# Patient Record
Sex: FEMALE | Race: BLACK OR AFRICAN AMERICAN | Employment: PART TIME | URBAN - METROPOLITAN AREA
[De-identification: names, ages, dates, MRNs, and addresses within clinical notes are randomized per-mention and may not be internally consistent; named-entity substitution may affect disease eponyms.]

---

## 2017-02-20 DIAGNOSIS — Z01.419 ENCOUNTER FOR GYNECOLOGICAL EXAMINATION WITHOUT ABNORMAL FINDING: ICD-10-CM

## 2017-02-21 RX ORDER — MEDROXYPROGESTERONE ACETATE 10 MG/1
TABLET ORAL
Qty: 70 TAB | Refills: 0 | Status: SHIPPED | OUTPATIENT
Start: 2017-02-21 | End: 2018-03-15 | Stop reason: SDUPTHER

## 2018-03-15 DIAGNOSIS — Z01.419 ENCOUNTER FOR GYNECOLOGICAL EXAMINATION WITHOUT ABNORMAL FINDING: ICD-10-CM

## 2018-03-15 RX ORDER — MEDROXYPROGESTERONE ACETATE 10 MG/1
TABLET ORAL
Qty: 70 TAB | Refills: 0 | Status: SHIPPED | OUTPATIENT
Start: 2018-03-15 | End: 2019-01-17 | Stop reason: ALTCHOICE

## 2018-12-15 ENCOUNTER — HOSPITAL ENCOUNTER (EMERGENCY)
Age: 23
Discharge: HOME | End: 2018-12-15
Attending: EMERGENCY MEDICINE | Admitting: EMERGENCY MEDICINE
Payer: COMMERCIAL

## 2018-12-15 VITALS
BODY MASS INDEX: 34.36 KG/M2 | HEART RATE: 77 BPM | SYSTOLIC BLOOD PRESSURE: 115 MMHG | DIASTOLIC BLOOD PRESSURE: 54 MMHG | OXYGEN SATURATION: 100 % | WEIGHT: 201.28 LBS | TEMPERATURE: 98 F | RESPIRATION RATE: 15 BRPM | HEIGHT: 64 IN

## 2018-12-15 DIAGNOSIS — N30.01 ACUTE CYSTITIS WITH HEMATURIA: ICD-10-CM

## 2018-12-15 DIAGNOSIS — N92.1 MENOMETRORRHAGIA: Primary | ICD-10-CM

## 2018-12-15 LAB
APPEARANCE UR: CLEAR
BACTERIA URNS QL MICRO: NEGATIVE /HPF
BILIRUB UR QL: NEGATIVE
CLUE CELLS VAG QL WET PREP: NORMAL
COLOR UR: ABNORMAL
EPITH CASTS URNS QL MICRO: ABNORMAL /LPF
GLUCOSE UR STRIP.AUTO-MCNC: NEGATIVE MG/DL
HCG UR QL: NEGATIVE
HGB UR QL STRIP: ABNORMAL
HYALINE CASTS URNS QL MICRO: ABNORMAL /LPF (ref 0–5)
KETONES UR QL STRIP.AUTO: NEGATIVE MG/DL
KOH PREP SPEC: NORMAL
LEUKOCYTE ESTERASE UR QL STRIP.AUTO: ABNORMAL
NITRITE UR QL STRIP.AUTO: NEGATIVE
PH UR STRIP: 7.5 [PH] (ref 5–8)
PROT UR STRIP-MCNC: NEGATIVE MG/DL
RBC #/AREA URNS HPF: ABNORMAL /HPF (ref 0–5)
SERVICE CMNT-IMP: NORMAL
SP GR UR REFRACTOMETRY: 1.02 (ref 1–1.03)
T VAGINALIS VAG QL WET PREP: NORMAL
UA: UC IF INDICATED,UAUC: ABNORMAL
UROBILINOGEN UR QL STRIP.AUTO: 1 EU/DL (ref 0.2–1)
WBC URNS QL MICRO: ABNORMAL /HPF (ref 0–4)

## 2018-12-15 PROCEDURE — 81001 URINALYSIS AUTO W/SCOPE: CPT

## 2018-12-15 PROCEDURE — 81025 URINE PREGNANCY TEST: CPT

## 2018-12-15 PROCEDURE — 87086 URINE CULTURE/COLONY COUNT: CPT

## 2018-12-15 PROCEDURE — 87491 CHLMYD TRACH DNA AMP PROBE: CPT

## 2018-12-15 PROCEDURE — 99284 EMERGENCY DEPT VISIT MOD MDM: CPT

## 2018-12-15 PROCEDURE — 74011250637 HC RX REV CODE- 250/637: Performed by: PHYSICIAN ASSISTANT

## 2018-12-15 PROCEDURE — 87210 SMEAR WET MOUNT SALINE/INK: CPT

## 2018-12-15 RX ORDER — NAPROXEN 250 MG/1
500 TABLET ORAL
Status: COMPLETED | OUTPATIENT
Start: 2018-12-15 | End: 2018-12-15

## 2018-12-15 RX ORDER — KETOROLAC TROMETHAMINE 10 MG/1
10 TABLET, FILM COATED ORAL
Qty: 20 TAB | Refills: 0 | Status: SHIPPED | OUTPATIENT
Start: 2018-12-15 | End: 2018-12-20

## 2018-12-15 RX ORDER — NITROFURANTOIN 25; 75 MG/1; MG/1
100 CAPSULE ORAL 2 TIMES DAILY
Qty: 14 CAP | Refills: 0 | Status: SHIPPED | OUTPATIENT
Start: 2018-12-15 | End: 2018-12-22

## 2018-12-15 RX ORDER — ACETAMINOPHEN 325 MG/1
650 TABLET ORAL
Status: COMPLETED | OUTPATIENT
Start: 2018-12-15 | End: 2018-12-15

## 2018-12-15 RX ADMIN — ACETAMINOPHEN 650 MG: 325 TABLET ORAL at 13:26

## 2018-12-15 RX ADMIN — NAPROXEN 500 MG: 250 TABLET ORAL at 13:26

## 2018-12-15 NOTE — LETTER
[address] 
[facility] EMERGENCY DEPT 
[address] [place] [address] [address] [id number] [phone] Work/School Note Date: 12/15/2018 To Whom It May concern: 
 
Shelby Muse was seen and treated today in the emergency room by the following provider(s): 
Physician Assistant: HAYDEE Iniguez. Shelby Muse may return to work on 12/15/18. Sincerely, HAYDEE Mo

## 2018-12-15 NOTE — ED PROVIDER NOTES
EMERGENCY DEPARTMENT HISTORY AND PHYSICAL EXAM    Date: 12/15/2018  Patient Name: Jasiel Zuleta    History of Presenting Illness     Chief Complaint   Patient presents with    Vaginal Bleeding     Ambulatory into the ED with c/o large amount of vaginal bleeding x 2 days -history of endometriosis. Pt is asymptomatic. History Provided By: Patient and Patient's Mother      HPI: Jasiel Zuleta is a 26 y.o. female with a PMH of asthma and endometriosis who presents with cc of abd cramping due to her menstrual cycle starting 2 days ago. Pt states she has endometriosis and this is similar to her previous cases of endometriosis. Pt denies any concern for pregnancy or stds. pts states she has gone through 2 pads today. Pt has not taken any medication for her symptoms    She specifically denies any fevers, chills, nausea, vomiting, chest pain, shortness of breath, headache, rash, diarrhea, sweating or weight loss. All other ROS negative at this time  Pt is in no acute distress and is speaking in full sentences       PCP: None    Current Outpatient Medications   Medication Sig Dispense Refill    ketorolac (TORADOL) 10 mg tablet Take 1 Tab by mouth every six (6) hours as needed for Pain for up to 5 days. 20 Tab 0    nitrofurantoin, macrocrystal-monohydrate, (MACROBID) 100 mg capsule Take 1 Cap by mouth two (2) times a day for 7 days. 14 Cap 0    medroxyPROGESTERone (PROVERA) 10 mg tablet TAKE 1 TABLET BY MOUTH DAILY FOR THE FIRST 10 DAYS OF EACH MONTH 70 Tab 0       Past History     Past Medical History:  Past Medical History:   Diagnosis Date    Asthma     Other ill-defined conditions(920.50)     ADHD    Seasonal allergies        Past Surgical History:  No past surgical history on file.     Family History:  Family History   Problem Relation Age of Onset    Diabetes Mother     Hypertension Mother    [facility] Diabetes Father     Hypertension Father        Social History:  Social History     Tobacco Use    Smoking status: Never Smoker   Substance Use Topics    Alcohol use: No    Drug use: Not on file       Allergies: Allergies   Allergen Reactions    Pcn [Penicillins] Hives         Review of Systems   Review of Systems   Constitutional: Negative. Negative for chills and fever. HENT: Negative. Eyes: Negative. Respiratory: Negative. Negative for shortness of breath. Cardiovascular: Negative. Negative for chest pain. Gastrointestinal: Positive for abdominal pain (cramping). Negative for diarrhea, nausea and vomiting. Endocrine: Negative. Genitourinary: Positive for menstrual problem and vaginal bleeding. Negative for decreased urine volume, dysuria, flank pain, vaginal discharge and vaginal pain. Musculoskeletal: Negative. Skin: Negative. Allergic/Immunologic: Negative. Neurological: Negative. Negative for headaches. Hematological: Negative. Psychiatric/Behavioral: Negative. All other systems reviewed and are negative. Physical Exam     Vitals:    12/15/18 1205   BP: 115/54   Pulse: 77   Resp: 15   Temp: 98 °F (36.7 °C)   SpO2: 100%   Weight: 91.3 kg (201 lb 4.5 oz)   Height: 5' 4\" (1.626 m)     Physical Exam   Constitutional: She is oriented to person, place, and time. She appears well-developed and well-nourished. No distress. HENT:   Head: Normocephalic and atraumatic. Right Ear: External ear normal.   Left Ear: External ear normal.   Nose: Nose normal.   Mouth/Throat: Oropharynx is clear and moist.   Eyes: Conjunctivae and EOM are normal. Pupils are equal, round, and reactive to light. Neck: Normal range of motion. Neck supple. No tracheal deviation present. Cardiovascular: Normal rate, regular rhythm, normal heart sounds and intact distal pulses. Pulmonary/Chest: Effort normal and breath sounds normal. No respiratory distress. She has no wheezes. Abdominal: Soft. Bowel sounds are normal. She exhibits no distension.  There is no tenderness (non tender throughout abdomen). There is no rebound, no CVA tenderness, no tenderness at McBurney's point and negative Mccoy's sign. Genitourinary: No labial fusion. There is no rash, tenderness, lesion or injury on the right labia. There is no rash, tenderness, lesion or injury on the left labia. Cervix exhibits no motion tenderness, no discharge (mild blood present in the vaginal vault) and no friability. No bleeding in the vagina. Musculoskeletal: Normal range of motion. She exhibits no edema, tenderness or deformity. Lymphadenopathy:     She has no cervical adenopathy. Neurological: She is alert and oriented to person, place, and time. She has normal reflexes. She displays normal reflexes. No cranial nerve deficit. She exhibits normal muscle tone. Coordination normal.   Skin: Skin is warm and dry. She is not diaphoretic. No pallor. Psychiatric: She has a normal mood and affect. Her behavior is normal. Judgment and thought content normal.   Nursing note and vitals reviewed.         Diagnostic Study Results     Labs -     Recent Results (from the past 12 hour(s))   HCG URINE, QL. - POC    Collection Time: 12/15/18  1:04 PM   Result Value Ref Range    Pregnancy test,urine (POC) NEGATIVE  NEG     WET PREP    Collection Time: 12/15/18  1:07 PM   Result Value Ref Range    Clue cells CLUE CELLS PRESENT      Wet prep NO TRICHOMONAS SEEN     KOH, OTHER SOURCES    Collection Time: 12/15/18  1:07 PM   Result Value Ref Range    Special Requests: NO SPECIAL REQUESTS      KOH NO YEAST SEEN     URINALYSIS W/ REFLEX CULTURE    Collection Time: 12/15/18  1:27 PM   Result Value Ref Range    Color YELLOW/STRAW      Appearance CLEAR CLEAR      Specific gravity 1.025 1.003 - 1.030      pH (UA) 7.5 5.0 - 8.0      Protein NEGATIVE  NEG mg/dL    Glucose NEGATIVE  NEG mg/dL    Ketone NEGATIVE  NEG mg/dL    Bilirubin NEGATIVE  NEG      Blood TRACE (A) NEG      Urobilinogen 1.0 0.2 - 1.0 EU/dL    Nitrites NEGATIVE  NEG      Leukocyte Esterase SMALL (A) NEG      WBC 10-20 0 - 4 /hpf    RBC 0-5 0 - 5 /hpf    Epithelial cells FEW FEW /lpf    Bacteria NEGATIVE  NEG /hpf    UA:UC IF INDICATED URINE CULTURE ORDERED (A) CNI      Hyaline cast 2-5 0 - 5 /lpf       Radiologic Studies -   No orders to display     CT Results  (Last 48 hours)    None        CXR Results  (Last 48 hours)    None            Medical Decision Making   I am the first provider for this patient. I reviewed the vital signs, available nursing notes, past medical history, past surgical history, family history and social history. Vital Signs-Reviewed the patient's vital signs. Records Reviewed: Nursing Notes, Old Medical Records, Previous Radiology Studies and Previous Laboratory Studies            Disposition:  Discharge     DISCHARGE NOTE:   Care plan outlined and precautions discussed. Patient has no new complaints, changes, or physical findings. Results of visit were reviewed with the patient. All medications were reviewed with the patient; will d/c home. All of pt's questions and concerns were addressed. Patient was instructed and agrees to follow up with pcp, as well as to return to the ED upon further deterioration. Patient is ready to go home. Follow-up Information     Follow up With Specialties Details Why Contact Info    [facility] Emergency Medicine   [facility]  [address]   [address] [address]  [address]  Schedule an appointment as soon as possible for a visit in 3 days If symptoms worsen [address] [address]          Discharge Medication List as of 12/15/2018  1:53 PM      START taking these medications    Details   ketorolac (TORADOL) 10 mg tablet Take 1 Tab by mouth every six (6) hours as needed for Pain for up to 5 days. , Normal, Disp-20 Tab, R-0         CONTINUE these medications which have NOT CHANGED    Details   medroxyPROGESTERone (PROVERA) 10 mg tablet TAKE 1 TABLET BY MOUTH DAILY FOR THE FIRST 10 DAYS OF EACH MONTH, Normal, Disp-70 Tab, R-0             Provider Notes (Medical Decision Making):   Pt states present episode non different from her previous episodes. Pt is in no distress will do swabs, pelvic and urine preg  Worsening si/sxs discussed extensively   Follow up with PCP or RTC if symptoms/signs worsen  Side effects of medication discussed  Education materials provided at discharge   Pt verbalizes agreement with plan      Procedures:  Procedures  Procedure Note - Pelvic Exam:    1:07 PM  Performed by: HAYDEE Rosado   Chaperoned by: Lili Grande RN  Pelvic exam was performed using bimanual and speculum. No CMT Further findings noted in physical exam.   The procedure took 1-15 minutes, and pt tolerated well. Diagnosis     Clinical Impression:   1. Menometrorrhagia    2.  Acute cystitis with hematuria

## 2018-12-15 NOTE — LETTER
[address] 
[facility] EMERGENCY DEPT 
[address] [address] 
[phone] Work/School Note Date: 12/15/2018 To Whom It May concern: 
 
Rafi Martinez was seen and treated today in the emergency room by the following provider(s): 
Physician Assistant: Clancy Nyhan, PA. Rafi Martinez may return to work on 12/17/18. Sincerely, HAYDEE Sousa

## 2018-12-15 NOTE — DISCHARGE INSTRUCTIONS
Abnormal Uterine Bleeding: Care Instructions  Your Care Instructions    Abnormal uterine bleeding (AUB) is irregular bleeding from the uterus that is longer or heavier than usual or does not occur at your regular time. Sometimes it is caused by changes in hormone levels. It can also be caused by growths in the uterus, such as fibroids or polyps. Sometimes a cause cannot be found. You may have heavy bleeding when you are not expecting your period. Your doctor may suggest a pregnancy test, if you think you are pregnant. Follow-up care is a key part of your treatment and safety. Be sure to make and go to all appointments, and call your doctor if you are having problems. It's also a good idea to know your test results and keep a list of the medicines you take. How can you care for yourself at home? · Be safe with medicines. Take pain medicines exactly as directed. ? If the doctor gave you a prescription medicine for pain, take it as prescribed. ? If you are not taking a prescription pain medicine, ask your doctor if you can take an over-the-counter medicine. · You may be low in iron because of blood loss. Eat a balanced diet that is high in iron and vitamin C. Foods rich in iron include red meat, shellfish, eggs, beans, and leafy green vegetables. Talk to your doctor about whether you need to take iron pills or a multivitamin. When should you call for help? Call 911 anytime you think you may need emergency care. For example, call if:    · You passed out (lost consciousness).    Call your doctor now or seek immediate medical care if:    · You have new or worse belly or pelvic pain.     · You have severe vaginal bleeding.     · You feel dizzy or lightheaded, or you feel like you may faint.    Watch closely for changes in your health, and be sure to contact your doctor if:    · You think you may be pregnant.     · Your bleeding gets worse.     · You do not get better as expected.    Where can you learn more?  Go to http://racquel-rodrigo.info/. Enter S447 in the search box to learn more about \"Abnormal Uterine Bleeding: Care Instructions. \"  Current as of: May 15, 2018  Content Version: 11.8  © 9011-5597 Healthwise, [employer]. Care instructions adapted under license by [employer] (which disclaims liability or warranty for this information). If you have questions about a medical condition or this instruction, always ask your healthcare professional. [address] any warranty or liability for your use of this information.

## 2018-12-17 LAB
BACTERIA SPEC CULT: NORMAL
CC UR VC: NORMAL
SERVICE CMNT-IMP: NORMAL

## 2018-12-18 LAB
C TRACH DNA SPEC QL NAA+PROBE: POSITIVE
N GONORRHOEA DNA SPEC QL NAA+PROBE: NEGATIVE
SAMPLE TYPE: ABNORMAL
SERVICE CMNT-IMP: ABNORMAL
SPECIMEN SOURCE: ABNORMAL

## 2018-12-18 RX ORDER — AZITHROMYCIN 250 MG/1
1000 TABLET, FILM COATED ORAL DAILY
Qty: 4 TAB | Refills: 0 | Status: SHIPPED | OUTPATIENT
Start: 2018-12-18 | End: 2018-12-19

## 2018-12-18 NOTE — PROGRESS NOTES
Pt returned call. Reviewed lab results. 1gm Zithromax efiled to her pharmacy of choice. Encouraged her to contact her partner(s) so they may be tested/treated. Good understanding noted.

## 2018-12-18 NOTE — PROGRESS NOTES
Chart reviewed. Pt self swabbed. No pelvic exam performed. Diagnosed with menorrhagia. No empiric tx for STDs. Left message requesting return call on patient' cell phone.

## 2019-01-17 ENCOUNTER — OFFICE VISIT (OUTPATIENT)
Dept: FAMILY MEDICINE CLINIC | Age: 24
End: 2019-01-17

## 2019-01-17 ENCOUNTER — HOSPITAL ENCOUNTER (OUTPATIENT)
Dept: LAB | Age: 24
Discharge: HOME | End: 2019-01-17
Payer: COMMERCIAL

## 2019-01-17 VITALS
HEART RATE: 84 BPM | SYSTOLIC BLOOD PRESSURE: 116 MMHG | TEMPERATURE: 99 F | WEIGHT: 214 LBS | HEIGHT: 64 IN | DIASTOLIC BLOOD PRESSURE: 66 MMHG | RESPIRATION RATE: 20 BRPM | BODY MASS INDEX: 36.54 KG/M2 | OXYGEN SATURATION: 99 %

## 2019-01-17 DIAGNOSIS — N30.01 ACUTE CYSTITIS WITH HEMATURIA: ICD-10-CM

## 2019-01-17 DIAGNOSIS — Z01.419 ENCOUNTER FOR GYNECOLOGICAL EXAMINATION: ICD-10-CM

## 2019-01-17 DIAGNOSIS — Z32.00 POSSIBLE PREGNANCY: ICD-10-CM

## 2019-01-17 DIAGNOSIS — Z13.29 SCREENING FOR THYROID DISORDER: ICD-10-CM

## 2019-01-17 DIAGNOSIS — Z00.00 ENCOUNTER FOR ANNUAL PHYSICAL EXAM: Primary | ICD-10-CM

## 2019-01-17 DIAGNOSIS — Z30.011 OCP (ORAL CONTRACEPTIVE PILLS) INITIATION: ICD-10-CM

## 2019-01-17 DIAGNOSIS — E55.9 VITAMIN D DEFICIENCY: ICD-10-CM

## 2019-01-17 DIAGNOSIS — Z13.220 SCREENING CHOLESTEROL LEVEL: ICD-10-CM

## 2019-01-17 DIAGNOSIS — R73.02 IGT (IMPAIRED GLUCOSE TOLERANCE): ICD-10-CM

## 2019-01-17 DIAGNOSIS — R35.0 FREQUENCY OF URINATION: ICD-10-CM

## 2019-01-17 LAB
BILIRUB UR QL STRIP: NEGATIVE
GLUCOSE UR-MCNC: NEGATIVE MG/DL
HCG URINE, QL. (POC): NEGATIVE
KETONES P FAST UR STRIP-MCNC: NEGATIVE MG/DL
NEGATIVE CONTROL: NORMAL
PH UR STRIP: 7 [PH] (ref 4.6–8)
PROT UR QL STRIP: NORMAL
SP GR UR STRIP: 1.02 (ref 1–1.03)
UA UROBILINOGEN AMB POC: NORMAL (ref 0.2–1)
URINALYSIS CLARITY POC: CLEAR
URINALYSIS COLOR POC: YELLOW
URINE BLOOD POC: NEGATIVE
URINE LEUKOCYTES POC: NORMAL
URINE NITRITES POC: NEGATIVE
VALID INTERNAL CONTROL?: YES

## 2019-01-17 PROCEDURE — 88175 CYTOPATH C/V AUTO FLUID REDO: CPT

## 2019-01-17 RX ORDER — NORGESTIMATE AND ETHINYL ESTRADIOL 0.25-0.035
1 KIT ORAL DAILY
Qty: 1 DOSE PACK | Refills: 6 | Status: SHIPPED | OUTPATIENT
Start: 2019-01-17 | End: 2019-12-09

## 2019-01-17 RX ORDER — CIPROFLOXACIN 500 MG/1
500 TABLET ORAL 2 TIMES DAILY
Qty: 14 TAB | Refills: 0 | Status: SHIPPED | OUTPATIENT
Start: 2019-01-17 | End: 2019-01-24

## 2019-01-17 NOTE — PROGRESS NOTES
Chief Complaint   Patient presents with    New Patient     discuss birth control     HPI:  Glenna Layton is a 26 y.o. AA female with no significant pmhx presents to establish care for a complete physical.  Also, she wants to discuss options for birth control. Patient has no complaints. Review of Systems  Constitutional: negative for fevers/chills  Eyes:   negative for visual disturbance  Respiratory:  negative for cough, dyspnea, wheezing  CV:   negative for chest pain, palpitations, lower extremity edema  GI:   negative for nausea, vomiting, diarrhea, abdominal pain  Endo:               negative for polyuria,polydipsia,polyphagia,heat intolerance  Genitourinary: negative for frequency, dysuria  Integument:  negative for rash and pruritus  Musculoskel: negative for myalgias, arthralgias, back pain, joint pain  Neurological:  negative for headaches, dizziness  Behavl/Psych: negative for feelings of anxiety, depression, mood changes    Past Medical History:   Diagnosis Date    Asthma     Other ill-defined conditions(277.87)     ADHD    Seasonal allergies      History reviewed. No pertinent surgical history.   Social History     Socioeconomic History    Marital status: SINGLE     Spouse name: Not on file    Number of children: Not on file    Years of education: Not on file    Highest education level: Not on file   Tobacco Use    Smoking status: Never Smoker    Smokeless tobacco: Never Used   Substance and Sexual Activity    Alcohol use: No    Drug use: Yes     Types: Marijuana    Sexual activity: Yes     Partners: Male     Birth control/protection: Condom     Family History   Problem Relation Age of Onset    Diabetes Mother     Hypertension Mother     Diabetes Father     Hypertension Father      Current Outpatient Medications   Medication Sig Dispense Refill    medroxyPROGESTERone (PROVERA) 10 mg tablet TAKE 1 TABLET BY MOUTH DAILY FOR THE FIRST 10 DAYS OF EACH MONTH 70 Tab 0     Allergies Allergen Reactions    Pcn [Penicillins] Hives       Objective:  Visit Vitals  /66   Pulse 84   Temp 99 °F (37.2 °C) (Oral)   Resp 20   Ht 5' 4\" (1.626 m)   Wt 214 lb (97.1 kg)   LMP 12/19/2018   SpO2 99%   BMI 36.73 kg/m²     Physical Exam:   General appearance - obese, alert, well appearing in no distress  Mental status - alert, oriented to person, place, and time  EYE-PERRL, EOMI  ENT-ENT exam normal, no neck nodes or sinus tenderness  Mouth - mucous membranes moist, pharynx normal without lesions  Neck - supple, no significant adenopathy   Chest - clear to auscultation, no wheezes, rales or rhonchi    Heart - normal rate, regular rhythm, normal blood pressure  Abdomen - soft, nontender, nondistended, no organomegaly  Ext-peripheral pulses normal, no pedal edema  Neuro -alert, oriented, normal speech, no focal findings  Back-full range of motion, no tenderness, palpable spasm or pain on motion     Pregnancy test and U/A are negative    Assessment/Plan:  Diagnoses and all orders for this visit:    Encounter for annual physical exam  -     METABOLIC PANEL, COMPREHENSIVE  -     CBC WITH AUTOMATED DIFF    Screening for thyroid disorder  -     TSH 3RD GENERATION    Vitamin D deficiency  -     VITAMIN D, 25 HYDROXY    Screening cholesterol level  -     LIPID PANEL    Frequency of urination  -     URINALYSIS W/ RFLX MICROSCOPIC  -     AMB POC URINALYSIS DIP STICK AUTO W/O MICRO    IGT (impaired glucose tolerance)  -     HEMOGLOBIN A1C WITH EAG    Possible pregnancy  -     AMB POC URINE PREGNANCY TEST, VISUAL COLOR COMPARISON    OCP (oral contraceptive pills) initiation  -     norgestimate-ethinyl estradiol (ORTHO-CYCLEN, SPRINTEC) 0.25-35 mg-mcg tab; Take 1 Tab by mouth daily. , Normal, Disp-1 Dose Pack, R-6    Acute cystitis with hematuria  -     ciprofloxacin HCl (CIPRO) 500 mg tablet; Take 1 Tab by mouth two (2) times a day for 7 days. , Normal, Disp-14 Tab, R-0    Encounter for gynecological examination  - PAP (IMAGE GUIDED) W/REFL HPV ALL PATHOLOGY ([id number]); Future  -     PAP (IMAGE GUIDED) W/REFL HPV ALL PATHOLOGY ([id number])    Other orders  -     MICROSCOPIC EXAMINATION      Patient Instructions      Ethinyl Estradiol/Norgestimate (Estarylla, Mono-Linyah, MonoNessa, Ortho Tri-Cyclen) - (By mouth)   Why this medicine is used:   Prevents pregnancy and treats acne. Contact a nurse or doctor right away if you have:  · Blistering, peeling, red skin rash  · Chest pain, trouble breathing, or coughing up blood  · Breast lumps, tenderness, pain, swelling, or discharge  · Sudden or severe headache, problems with vision, speech, or walking  · Nausea, unusual sweating, fainting  · Numbness or weakness on one side of your body, lower leg pain  · Unusual or unexpected vaginal bleeding or heavy bleeding  · Yellow skin or eyes   © 2017 [address] is for End User's use only and may not be sold, redistributed or otherwise used for commercial purposes. Follow-up Disposition:  Return 2-3 weeks, for f/u results.

## 2019-01-17 NOTE — PATIENT INSTRUCTIONS
Ethinyl Estradiol/Norgestimate (Estarylla, Mono-Linyah, MonoNessa, Ortho Tri-Cyclen) - (By mouth)   Why this medicine is used:   Prevents pregnancy and treats acne. Contact a nurse or doctor right away if you have:  · Blistering, peeling, red skin rash  · Chest pain, trouble breathing, or coughing up blood  · Breast lumps, tenderness, pain, swelling, or discharge  · Sudden or severe headache, problems with vision, speech, or walking  · Nausea, unusual sweating, fainting  · Numbness or weakness on one side of your body, lower leg pain  · Unusual or unexpected vaginal bleeding or heavy bleeding  · Yellow skin or eyes   © 2017 [address] is for End User's use only and may not be sold, redistributed or otherwise used for commercial purposes.

## 2019-01-18 LAB
25(OH)D3+25(OH)D2 SERPL-MCNC: 8 NG/ML (ref 30–100)
ALBUMIN SERPL-MCNC: 4.7 G/DL (ref 3.5–5.5)
ALBUMIN/GLOB SERPL: 2.5 {RATIO} (ref 1.2–2.2)
ALP SERPL-CCNC: 68 IU/L (ref 39–117)
ALT SERPL-CCNC: 15 IU/L (ref 0–32)
APPEARANCE UR: CLEAR
AST SERPL-CCNC: 17 IU/L (ref 0–40)
BACTERIA #/AREA URNS HPF: ABNORMAL /[HPF]
BASOPHILS # BLD AUTO: 0 X10E3/UL (ref 0–0.2)
BASOPHILS NFR BLD AUTO: 0 %
BILIRUB SERPL-MCNC: 1.1 MG/DL (ref 0–1.2)
BILIRUB UR QL STRIP: NEGATIVE
BUN SERPL-MCNC: 9 MG/DL (ref 6–20)
BUN/CREAT SERPL: 15 (ref 9–23)
CALCIUM SERPL-MCNC: 9.1 MG/DL (ref 8.7–10.2)
CASTS URNS QL MICRO: ABNORMAL /LPF
CHLORIDE SERPL-SCNC: 102 MMOL/L (ref 96–106)
CHOLEST SERPL-MCNC: 126 MG/DL (ref 100–199)
CO2 SERPL-SCNC: 23 MMOL/L (ref 20–29)
COLOR UR: YELLOW
CREAT SERPL-MCNC: 0.62 MG/DL (ref 0.57–1)
CRYSTALS URNS MICRO: ABNORMAL
EOSINOPHIL # BLD AUTO: 0 X10E3/UL (ref 0–0.4)
EOSINOPHIL NFR BLD AUTO: 1 %
EPI CELLS #/AREA URNS HPF: ABNORMAL /HPF
ERYTHROCYTE [DISTWIDTH] IN BLOOD BY AUTOMATED COUNT: 13.2 % (ref 12.3–15.4)
EST. AVERAGE GLUCOSE BLD GHB EST-MCNC: 94 MG/DL
GLOBULIN SER CALC-MCNC: 1.9 G/DL (ref 1.5–4.5)
GLUCOSE SERPL-MCNC: 94 MG/DL (ref 65–99)
GLUCOSE UR QL: NEGATIVE
HBA1C MFR BLD: 4.9 % (ref 4.8–5.6)
HCT VFR BLD AUTO: 38.3 % (ref 34–46.6)
HDLC SERPL-MCNC: 49 MG/DL
HGB BLD-MCNC: 12.9 G/DL (ref 11.1–15.9)
HGB UR QL STRIP: NEGATIVE
IMM GRANULOCYTES # BLD AUTO: 0 X10E3/UL (ref 0–0.1)
IMM GRANULOCYTES NFR BLD AUTO: 0 %
KETONES UR QL STRIP: NEGATIVE
LDLC SERPL CALC-MCNC: 50 MG/DL (ref 0–99)
LEUKOCYTE ESTERASE UR QL STRIP: ABNORMAL
LYMPHOCYTES # BLD AUTO: 2.3 X10E3/UL (ref 0.7–3.1)
LYMPHOCYTES NFR BLD AUTO: 39 %
MCH RBC QN AUTO: 30.9 PG (ref 26.6–33)
MCHC RBC AUTO-ENTMCNC: 33.7 G/DL (ref 31.5–35.7)
MCV RBC AUTO: 92 FL (ref 79–97)
MICRO URNS: ABNORMAL
MONOCYTES # BLD AUTO: 0.3 X10E3/UL (ref 0.1–0.9)
MONOCYTES NFR BLD AUTO: 5 %
MUCOUS THREADS URNS QL MICRO: PRESENT
NEUTROPHILS # BLD AUTO: 3.2 X10E3/UL (ref 1.4–7)
NEUTROPHILS NFR BLD AUTO: 55 %
NITRITE UR QL STRIP: NEGATIVE
PH UR STRIP: 7.5 [PH] (ref 5–7.5)
PLATELET # BLD AUTO: 256 X10E3/UL (ref 150–379)
POTASSIUM SERPL-SCNC: 4.3 MMOL/L (ref 3.5–5.2)
PROT SERPL-MCNC: 6.6 G/DL (ref 6–8.5)
PROT UR QL STRIP: NEGATIVE
RBC # BLD AUTO: 4.17 X10E6/UL (ref 3.77–5.28)
RBC #/AREA URNS HPF: ABNORMAL /HPF
SODIUM SERPL-SCNC: 142 MMOL/L (ref 134–144)
SP GR UR: 1.03 (ref 1–1.03)
TRIGL SERPL-MCNC: 135 MG/DL (ref 0–149)
TSH SERPL DL<=0.005 MIU/L-ACNC: 1.46 UIU/ML (ref 0.45–4.5)
UNIDENT CRYS URNS QL MICRO: PRESENT
UROBILINOGEN UR STRIP-MCNC: 1 MG/DL (ref 0.2–1)
VLDLC SERPL CALC-MCNC: 27 MG/DL (ref 5–40)
WBC # BLD AUTO: 5.8 X10E3/UL (ref 3.4–10.8)
WBC #/AREA URNS HPF: ABNORMAL /HPF

## 2019-02-27 DIAGNOSIS — E55.9 VITAMIN D DEFICIENCY: ICD-10-CM

## 2019-02-27 DIAGNOSIS — N30.00 ACUTE CYSTITIS WITHOUT HEMATURIA: Primary | ICD-10-CM

## 2019-02-27 RX ORDER — CHOLECALCIFEROL TAB 125 MCG (5000 UNIT) 125 MCG
5000 TAB ORAL DAILY
Qty: 90 TAB | Refills: 1 | Status: SHIPPED | OUTPATIENT
Start: 2019-02-27 | End: 2019-12-09

## 2019-02-27 RX ORDER — CIPROFLOXACIN 500 MG/1
500 TABLET ORAL 2 TIMES DAILY
Qty: 10 TAB | Refills: 0 | Status: SHIPPED | OUTPATIENT
Start: 2019-02-27 | End: 2019-03-04

## 2019-02-27 NOTE — PROGRESS NOTES
Please call the office for appointment within 1-2 weeks  to discuss results and management. Vit D is very low, results shows uti.   Vit d supplement and antibiotics have sent to your pharmacy

## 2019-03-08 ENCOUNTER — TELEPHONE (OUTPATIENT)
Dept: FAMILY MEDICINE CLINIC | Age: 24
End: 2019-03-08

## 2019-03-08 NOTE — TELEPHONE ENCOUNTER
----- Message from Josephine Rollins MD sent at 2/27/2019  2:24 PM EST -----      Please call the office for appointment within 1-2 weeks  to discuss results and management. Vit D is very low, results shows uti.   Vit d supplement and antibiotics have sent to your pharmacy

## 2019-03-29 ENCOUNTER — HOSPITAL ENCOUNTER (EMERGENCY)
Age: 24
Discharge: LEFT BEFORE DISCHARGE | End: 2019-03-29
Attending: EMERGENCY MEDICINE
Payer: COMMERCIAL

## 2019-03-29 VITALS
HEART RATE: 78 BPM | HEIGHT: 64 IN | OXYGEN SATURATION: 98 % | TEMPERATURE: 98.6 F | BODY MASS INDEX: 35.17 KG/M2 | RESPIRATION RATE: 20 BRPM | DIASTOLIC BLOOD PRESSURE: 90 MMHG | SYSTOLIC BLOOD PRESSURE: 138 MMHG | WEIGHT: 206 LBS

## 2019-03-29 PROCEDURE — 75810000275 HC EMERGENCY DEPT VISIT NO LEVEL OF CARE

## 2019-03-29 PROCEDURE — 93005 ELECTROCARDIOGRAM TRACING: CPT
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
## 2019-03-30 ENCOUNTER — HOSPITAL ENCOUNTER (EMERGENCY)
Age: 24
Discharge: HOME | End: 2019-03-30
Attending: EMERGENCY MEDICINE | Admitting: EMERGENCY MEDICINE
Payer: COMMERCIAL

## 2019-03-30 ENCOUNTER — APPOINTMENT (OUTPATIENT)
Dept: CT IMAGING | Age: 24
End: 2019-03-30
Attending: PHYSICIAN ASSISTANT
Payer: COMMERCIAL

## 2019-03-30 VITALS
WEIGHT: 207.45 LBS | SYSTOLIC BLOOD PRESSURE: 116 MMHG | HEIGHT: 64 IN | RESPIRATION RATE: 16 BRPM | BODY MASS INDEX: 35.42 KG/M2 | DIASTOLIC BLOOD PRESSURE: 97 MMHG | OXYGEN SATURATION: 100 % | TEMPERATURE: 98.4 F | HEART RATE: 80 BPM

## 2019-03-30 DIAGNOSIS — F43.0 ACUTE STRESS REACTION: Primary | ICD-10-CM

## 2019-03-30 DIAGNOSIS — S16.1XXA STRAIN OF NECK MUSCLE, INITIAL ENCOUNTER: ICD-10-CM

## 2019-03-30 DIAGNOSIS — S09.90XA INJURY OF HEAD, INITIAL ENCOUNTER: ICD-10-CM

## 2019-03-30 LAB
ATRIAL RATE: 73 BPM
CALCULATED P AXIS, ECG09: 50 DEGREES
CALCULATED R AXIS, ECG10: 36 DEGREES
CALCULATED T AXIS, ECG11: 36 DEGREES
DIAGNOSIS, 93000: NORMAL
HCG UR QL: NEGATIVE
P-R INTERVAL, ECG05: 150 MS
Q-T INTERVAL, ECG07: 352 MS
QRS DURATION, ECG06: 96 MS
QTC CALCULATION (BEZET), ECG08: 387 MS
VENTRICULAR RATE, ECG03: 73 BPM

## 2019-03-30 PROCEDURE — 70450 CT HEAD/BRAIN W/O DYE: CPT

## 2019-03-30 PROCEDURE — 72125 CT NECK SPINE W/O DYE: CPT

## 2019-03-30 PROCEDURE — 74011250637 HC RX REV CODE- 250/637: Performed by: PHYSICIAN ASSISTANT

## 2019-03-30 PROCEDURE — 99284 EMERGENCY DEPT VISIT MOD MDM: CPT

## 2019-03-30 PROCEDURE — 81025 URINE PREGNANCY TEST: CPT

## 2019-03-30 RX ORDER — CYCLOBENZAPRINE HCL 10 MG
10 TABLET ORAL
Status: COMPLETED | OUTPATIENT
Start: 2019-03-30 | End: 2019-03-30

## 2019-03-30 RX ORDER — IBUPROFEN 600 MG/1
600 TABLET ORAL
Status: COMPLETED | OUTPATIENT
Start: 2019-03-30 | End: 2019-03-30

## 2019-03-30 RX ORDER — IBUPROFEN 600 MG/1
600 TABLET ORAL
Qty: 20 TAB | Refills: 0 | Status: SHIPPED | OUTPATIENT
Start: 2019-03-30

## 2019-03-30 RX ORDER — CYCLOBENZAPRINE HCL 10 MG
10 TABLET ORAL
Qty: 15 TAB | Refills: 0 | Status: SHIPPED | OUTPATIENT
Start: 2019-03-30 | End: 2019-12-09

## 2019-03-30 RX ADMIN — IBUPROFEN 600 MG: 600 TABLET, FILM COATED ORAL at 18:07

## 2019-03-30 RX ADMIN — CYCLOBENZAPRINE HYDROCHLORIDE 10 MG: 10 TABLET, FILM COATED ORAL at 18:07

## 2019-03-30 NOTE — DISCHARGE INSTRUCTIONS
Patient Education        Anxiety Disorder: Care Instructions  Your Care Instructions    Anxiety is a normal reaction to stress. Difficult situations can cause you to have symptoms such as sweaty palms and a nervous feeling. In an anxiety disorder, the symptoms are far more severe. Constant worry, muscle tension, trouble sleeping, nausea and diarrhea, and other symptoms can make normal daily activities difficult or impossible. These symptoms may occur for no reason, and they can affect your work, school, or social life. Medicines, counseling, and self-care can all help. Follow-up care is a key part of your treatment and safety. Be sure to make and go to all appointments, and call your doctor if you are having problems. It's also a good idea to know your test results and keep a list of the medicines you take. How can you care for yourself at home? · Take medicines exactly as directed. Call your doctor if you think you are having a problem with your medicine. · Go to your counseling sessions and follow-up appointments. · Recognize and accept your anxiety. Then, when you are in a situation that makes you anxious, say to yourself, \"This is not an emergency. I feel uncomfortable, but I am not in danger. I can keep going even if I feel anxious. \"  · Be kind to your body:  ? Relieve tension with exercise or a massage. ? Get enough rest.  ? Avoid alcohol, caffeine, nicotine, and illegal drugs. They can increase your anxiety level and cause sleep problems. ? Learn and do relaxation techniques. See below for more about these techniques. · Engage your mind. Get out and do something you enjoy. Go to a funny movie, or take a walk or hike. Plan your day. Having too much or too little to do can make you anxious. · Keep a record of your symptoms. Discuss your fears with a good friend or family member, or join a support group for people with similar problems. Talking to others sometimes relieves stress.   · Get involved in social groups, or volunteer to help others. Being alone sometimes makes things seem worse than they are. · Get at least 30 minutes of exercise on most days of the week to relieve stress. Walking is a good choice. You also may want to do other activities, such as running, swimming, cycling, or playing tennis or team sports. Relaxation techniques  Do relaxation exercises 10 to 20 minutes a day. You can play soothing, relaxing music while you do them, if you wish. · Tell others in your house that you are going to do your relaxation exercises. Ask them not to disturb you. · Find a comfortable place, away from all distractions and noise. · Lie down on your back, or sit with your back straight. · Focus on your breathing. Make it slow and steady. · Breathe in through your nose. Breathe out through either your nose or mouth. · Breathe deeply, filling up the area between your navel and your rib cage. Breathe so that your belly goes up and down. · Do not hold your breath. · Breathe like this for 5 to 10 minutes. Notice the feeling of calmness throughout your whole body. As you continue to breathe slowly and deeply, relax by doing the following for another 5 to 10 minutes:  · Tighten and relax each muscle group in your body. You can begin at your toes and work your way up to your head. · Imagine your muscle groups relaxing and becoming heavy. · Empty your mind of all thoughts. · Let yourself relax more and more deeply. · Become aware of the state of calmness that surrounds you. · When your relaxation time is over, you can bring yourself back to alertness by moving your fingers and toes and then your hands and feet and then stretching and moving your entire body. Sometimes people fall asleep during relaxation, but they usually wake up shortly afterward. · Always give yourself time to return to full alertness before you drive a car or do anything that might cause an accident if you are not fully alert.  Never play a relaxation tape while you drive a car. When should you call for help? Call 911 anytime you think you may need emergency care. For example, call if:    · You feel you cannot stop from hurting yourself or someone else.   Hebert Dobbs the numbers for these national suicide hotlines: 0-772-740-TALK (1-242.330.3219) and 0-575-MQQXMSZ (7-828.771.8567). If you or someone you know talks about suicide or feeling hopeless, get help right away.   Watch closely for changes in your health, and be sure to contact your doctor if:    · You have anxiety or fear that affects your life.     · You have symptoms of anxiety that are new or different from those you had before. Where can you learn more? Go to http://racquelAito BVrodrigo.info/. Enter P754 in the search box to learn more about \"Anxiety Disorder: Care Instructions. \"  Current as of: September 11, 2018  Content Version: 11.9  © 4826-4839 [employer]. Care instructions adapted under license by [employer] (which disclaims liability or warranty for this information). If you have questions about a medical condition or this instruction, always ask your healthcare professional. [place] [address] any warranty or liability for your use of this information. Patient Education        Neck Strain: Care Instructions  Your Care Instructions    You have strained the muscles and ligaments in your neck. A sudden, awkward movement can strain the neck. This often occurs with falls or car accidents or during certain sports. Everyday activities like working on a computer or sleeping can also cause neck strain if they force you to hold your neck in an awkward position for a long time. It is common for neck pain to get worse for a day or two after an injury, but it should start to feel better after that. You may have more pain and stiffness for several days before it gets better. This is expected.  It may take a few weeks or longer for it to heal completely. Good home treatment can help you get better faster and avoid future neck problems. Follow-up care is a key part of your treatment and safety. Be sure to make and go to all appointments, and call your doctor if you are having problems. It's also a good idea to know your test results and keep a list of the medicines you take. How can you care for yourself at home? · If you were given a neck brace (cervical collar) to limit neck motion, wear it as instructed for as many days as your doctor tells you to. Do not wear it longer than you were told to. Wearing a brace for too long can make neck stiffness worse and weaken the neck muscles. · You can try using heat or ice to see if it helps. ? Try using a heating pad on a low or medium setting for 15 to 20 minutes every 2 to 3 hours. Try a warm shower in place of one session with the heating pad. You can also buy single-use heat wraps that last up to 8 hours. ? You can also try an ice pack for 10 to 15 minutes every 2 to 3 hours. · Take pain medicines exactly as directed. ? If the doctor gave you a prescription medicine for pain, take it as prescribed. ? If you are not taking a prescription pain medicine, ask your doctor if you can take an over-the-counter medicine. · Gently rub the area to relieve pain and help with blood flow. Do not massage the area if it hurts to do so. · Do not do anything that makes the pain worse. Take it easy for a couple of days. You can do your usual activities if they do not hurt your neck or put it at risk for more stress or injury. · Try sleeping on a special neck pillow. Place it under your neck, not under your head. Placing a tightly rolled-up towel under your neck while you sleep will also work. If you use a neck pillow or rolled towel, do not use your regular pillow at the same time. · To prevent future neck pain, do exercises to stretch and strengthen your neck and back.  Learn how to use good posture, safe lifting techniques, and proper body mechanics. When should you call for help? Call 911 anytime you think you may need emergency care. For example, call if:    · You are unable to move an arm or a leg at all.   [facility] your doctor now or seek immediate medical care if:    · You have new or worse symptoms in your arms, legs, chest, belly, or buttocks. Symptoms may include:  ? Numbness or tingling. ? Weakness. ? Pain.     · You lose bladder or bowel control.    Watch closely for changes in your health, and be sure to contact your doctor if:    · You are not getting better as expected. Where can you learn more? Go to http://racquel-rodrigo.info/. Enter M253 in the search box to learn more about \"Neck Strain: Care Instructions. \"  Current as of: September 20, 2018  Content Version: 11.9  © 2671-1637 [employer]. Care instructions adapted under license by Sara Campbell (which disclaims liability or warranty for this information). If you have questions about a medical condition or this instruction, always ask your healthcare professional. [address] any warranty or liability for your use of this information.
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
## 2019-07-03 ENCOUNTER — OFFICE VISIT (OUTPATIENT)
Dept: SURGERY | Age: 24
End: 2019-07-03

## 2019-07-03 VITALS
BODY MASS INDEX: 35 KG/M2 | HEIGHT: 64 IN | HEART RATE: 65 BPM | RESPIRATION RATE: 18 BRPM | WEIGHT: 205 LBS | TEMPERATURE: 98 F | SYSTOLIC BLOOD PRESSURE: 103 MMHG | DIASTOLIC BLOOD PRESSURE: 58 MMHG | OXYGEN SATURATION: 98 %

## 2019-07-03 DIAGNOSIS — N92.6 IRREGULAR MENSES: Primary | ICD-10-CM

## 2019-07-03 DIAGNOSIS — N93.9 ABNORMAL UTERINE BLEEDING (AUB): ICD-10-CM

## 2019-07-03 RX ORDER — ETONOGESTREL AND ETHINYL ESTRADIOL 11.7; 2.7 MG/1; MG/1
INSERT, EXTENDED RELEASE VAGINAL
Qty: 1 DEVICE | Refills: 12 | Status: SHIPPED | OUTPATIENT
Start: 2019-07-03

## 2019-07-03 NOTE — PROGRESS NOTES
Chief Complaint   Patient presents with    Irregular Menses     Pt reports her periods have periods always been irregular and she has been on OCP x 1 year. Pt reports she has been bleeding every two weeks for the last few months. 3 most recent PHQ Screens 7/3/2019   Little interest or pleasure in doing things Not at all   Feeling down, depressed, irritable, or hopeless Not at all   Total Score PHQ 2 0     1. Have you been to the ER, urgent care clinic since your last visit? Hospitalized since your last visit? No    2. Have you seen or consulted any other health care providers outside of the [address] [place] [address] since your last visit? Include any pap smears or colon screening.  No

## 2019-07-03 NOTE — PROGRESS NOTES
Abnormal bleeding note      Christina Alva is a 27 y.o. female who complains of vaginal bleeding problems. Her current method of family planning is OCP (estrogen/progesterone). Started OCPs a couple of months ago (more than 3 months). Has been having some irregular bleeding for a couple of months. Rarely misses a dose (1-2 out of a pack). Initially patient was regulated, but has not worked as well recently.    +cramping. Has improved with OCPs. Also occasional nausea. Periods were irregular prior to the birth control pills. Periods are not as heavy with the OCPs, but still somewhat heavy. Varied length with OCPs. Last about 15 days at longest.    Associated symptoms include nausea and cramping. Alleviating factors: none    Aggravating factors: none      The patient is sexually active. Last Pap smear:earlier this year, normal.    No recent stresses    Patient concerned as she has a strong family hx of cervical cancer. Patient has gotten the Gardasil vaccine     Her relevant past medical history:   Past Medical History:   Diagnosis Date    Asthma     Other ill-defined conditions(016.89)     ADHD    Seasonal allergies         History reviewed. No pertinent surgical history. Social History     Occupational History    Not on file   Tobacco Use    Smoking status: Never Smoker    Smokeless tobacco: Never Used   Substance and Sexual Activity    Alcohol use: No    Drug use: Yes     Types: Marijuana    Sexual activity: Yes     Partners: Male     Birth control/protection: Condom     Family History   Problem Relation Age of Onset    Diabetes Mother     Hypertension Mother     Diabetes Father     Hypertension Father        Allergies   Allergen Reactions    Pcn [Penicillins] Hives     Prior to Admission medications    Medication Sig Start Date End Date Taking? Authorizing Provider   ibuprofen (MOTRIN) 600 mg tablet Take 1 Tab by mouth every six (6) hours as needed for Pain.  3/30/19  Yes Noé Ortiz [place]   norgestimate-ethinyl estradiol (ORTHO-CYCLEN, SPRINTEC) 0.25-35 mg-mcg tab Take 1 Tab by mouth daily. 1/17/19  Yes Jemma Purcell MD   cyclobenzaprine (FLEXERIL) 10 mg tablet Take 1 Tab by mouth three (3) times daily as needed for Muscle Spasm(s). 3/30/19   Noé Borges PA   cholecalciferol, VITAMIN D3, (VITAMIN D3) 5,000 unit tab tablet Take 1 Tab by mouth daily.  2/27/19   Ian Krishna MD        Review of Systems - History obtained from the patient  Constitutional: negative for weight loss, fever, night sweats  HEENT: negative for hearing loss, earache, congestion, snoring, sorethroat  CV: negative for chest pain, palpitations, edema  Resp: negative for cough, shortness of breath, wheezing  Breast: negative for breast lumps, nipple discharge, galactorrhea  GI: negative for change in bowel habits, abdominal pain, black or bloody stools  : negative for frequency, dysuria, hematuria  MSK: negative for back pain, joint pain, muscle pain  Skin: negative for itching, rash, hives  Neuro: negative for dizziness, headache, confusion, weakness  Psych: negative for anxiety, depression, change in mood  Heme/lymph: negative for bleeding, bruising, pallor      Objective:    Visit Vitals  /58 (BP 1 Location: Right arm, BP Patient Position: Sitting)   Pulse 65   Temp 98 °F (36.7 °C) (Oral)   Resp 18   Ht 5' 4\" (1.626 m)   Wt 205 lb (93 kg)   LMP 06/26/2019   SpO2 98%   BMI 35.19 kg/m²          PHYSICAL EXAMINATION    Constitutional  · Appearance: well-nourished, well developed, alert, in no acute distress    HENT  · Head and Face: appears normal    Neck  · Inspection/Palpation: normal appearance, no masses or tenderness  · Lymph Nodes: no lymphadenopathy present  · Thyroid: gland size normal, nontender, no nodules or masses present on palpation    Gastrointestinal  · Abdominal Examination: abdomen non-tender to palpation, normal bowel sounds, no masses present  · Liver and spleen: no hepatomegaly present, spleen not palpable  · Hernias: no hernias identified    Genitourinary  · deferred    Skin  · General Inspection: no rash, no lesions identified    Neurologic/Psychiatric  · Mental Status:  · Orientation: grossly oriented to person, place and time  · Mood and Affect: mood normal, affect appropriate    Assessment:   27 y.o. with AUB on birth control    Plan:   - ultrasound  - TSH/T4, prolactin, testosterone  - will change from OCPs to Nuvaring        Instructions given to pt. Handouts given to pt. RTC in 3 weeks    Spent greater than 25 minutes with patient, over 50% of which was spent counselling.

## 2019-07-03 NOTE — PATIENT INSTRUCTIONS
Etonogestrel/Ethinyl Estradiol (Into the vagina) Ethinyl Estradiol (ETH-i-nil es-tra-DYE-ol), Etonogestrel (e-toe-loni-CAROL ANN-trel) Prevents pregnancy. Brand Name(s): NuvaRing There may be other brand names for this medicine. When This Medicine Should Not Be Used: This medicine is not right for everyone. Do not use it if you had an allergic reaction to etonogestrel or ethinyl estradiol, if you are pregnant, or if you have vaginal bleeding that has not been checked by a doctor. Do not use it if you have liver disease or tumors, breast cancer, problems with blood clots, or certain heart problems. How to Use This Medicine: Insert · This medicine is in a ring that is put into your vagina. Your doctor or nurse will show you how to put in the ring. The ring should be left in place for 3 weeks. It will then be removed and another one will be inserted 1 week later. During the week without the ring, you will usually have your menstrual period. · The first time you start using the ring, you should also use a second form of birth control during the first 7 days to avoid pregnancy. Do not use a diaphragm, because the ring may affect how the diaphragm fits. · Read and follow the patient instructions that come with this medicine. Talk to your doctor or pharmacist if you have any questions. · Once the ring is in place, you should not be able to feel it. If you feel uncomfortable, the ring may not be inserted far enough. Gently push the ring farther into your vagina. If you feel pain, talk to your doctor. · Check for the presence of the ring inside your vagina regularly (including before and after having sex). · The ring may move down accidently. This can happen if you are having a bowel movement. Gently push the ring back into place. If the ring comes all the way out, rinse it with warm water and put it back in. Call your doctor if the ring comes out several times. · Missed dose: ¨ If NuvaRing® has slipped out and it has been out for less than 3 hours, rinse it in cool or lukewarm water and reinsert it. You should still be protected from pregnancy. If [place] has been out for more than 3 hours, insert a new ring. You must use an extra method of birth control until the [place] has been in place for 7 days in a row. Do not use a diaphragm. ¨ If you leave the vaginal ring in place for more than 4 weeks, you may not be protected from pregnancy. Make sure that you are not pregnant before you insert a new ring. You must use an additional form of birth control (not a diaphragm) until the new ring has been in place for 7 days in a row. ¨ If you forget to insert a new ring after the ring-free week, call your doctor for instructions. · Store this medicine at room temperature, away from heat and direct light for up to 4 months. Place the used vaginal ring in the re-sealable foil pouch and throw it in the trash. Do not flush the ring down the toilet. Drugs and Foods to Avoid: Ask your doctor or pharmacist before using any other medicine, including over-the-counter medicines, vitamins, and herbal products. · Do not use this medicine together with medicine to treat hepatitis C virus infection, including ombitasvir/paritaprevir/ritonavir, with or without dasabuvir. · Some foods and medicines can affect how etonogestrel/ethinyl estradiol works. Tell your doctor if you are using any of the following: ¨ Acetaminophen, aprepitant, ascorbic acid, atorvastatin, bosentan, clofibric acid, cyclosporine, morphine, prednisolone, salicylic acid, Shalini's wort, temazepam, theophylline, tizanidine ¨ Medicine for HIV/AIDS (including boceprevir, telaprevir) ¨ Medicine to treat an infection (including fluconazole, griseofulvin, itraconazole, ketoconazole, miconazole, rifabutin, rifampicin, voriconazole) ¨ Medicine to treat seizures (including carbamazepine, felbamate, lamotrigine, oxcarbazepine, phenobarbital, phenytoin, rufinamide, topiramate) ¨ Thyroid medicine · Do not eat grapefruit or drink grapefruit juice while you are using this medicine. · Ask your doctor before you use other products or medicines into your vagina. You may need to remove the ring first. 
Warnings While Using This Medicine: · It is not safe to take this medicine during pregnancy. It could harm an unborn baby. Tell your doctor right away if you become pregnant. · Tell your doctor if you are breastfeeding, or if you recently had a baby, miscarriage, or . Tell your doctor if you have kidney disease, cervical cancer, diabetes, epilepsy, migraines, heart or blood vessel disease, high cholesterol, high blood pressure, or a history of depression or chloasma (skin discoloration on the face). Tell your doctor if you smoke or if you are having a surgery that requires inactivity for a long time. · This medicine may cause the following problems: 
¨ Increased risk of heart attack, stroke, or blood clots ¨ Toxic shock syndrome ¨ Liver problems ¨ High blood pressure ¨ Gallbladder disease ¨ High cholesterol or fats in the blood ¨ Increased risk of breast or cervical cancer · This medicine may cause skin discoloration. Use a sunscreen when you are outdoors. Avoid sunlamps and tanning beds. · This medicine will not protect you from HIV/AIDS or other sexually transmitted diseases. · You might have spotting or irregular bleeding when you first start using this medicine. You might have unplanned bleeding if you miss a dose or are late taking it. However, if you have heavy bleeding, call your doctor. · Tell any doctor or dentist who treats you that you are using this medicine. You may need to stop using this medicine several days before you have surgery or medical tests.  
· Tell any doctor or dentist who treats you that you are using this medicine. This medicine may affect certain medical test results. · Your doctor will check your progress and the effects of this medicine at regular visits. Keep all appointments. · Keep all medicine out of the reach of children. Never share your medicine with anyone. Possible Side Effects While Using This Medicine:  
Call your doctor right away if you notice any of these side effects: · Allergic reaction: Itching or hives, swelling in your face or hands, swelling or tingling in your mouth or throat, chest tightness, trouble breathing · Blistering, peeling, red skin rash · Breast lumps, tenderness, pain, swelling, or discharge · Chest pain that may spread, trouble breathing, coughing up blood · Dark urine or pale stools, loss of appetite, yellow skin or eyes · Fast, slow, or pounding heartbeat · Numbness or weakness on one side of your body, pain in your lower leg, sudden or severe headache, problems with vision, speech, or walking · Redness, pain, itching, or burning sensation inside your vagina · Sudden and severe stomach pain, nausea, vomiting, lightheadedness · Sudden high fever, diarrhea, dizziness, fainting, muscle aches, sunburn-like rash · Unusual or unexpected vaginal bleeding or heavy bleeding · Vision loss, double vision If you notice these less serious side effects, talk with your doctor: · Darkened skin on your face · Depression, mood changes · Headaches If you notice other side effects that you think are caused by this medicine, tell your doctor. Call your doctor for medical advice about side effects. You may report side effects to FDA at 3-751-NFA-1817 © 2017 [facility] [place] Information is for End User's use only and may not be sold, redistributed or otherwise used for commercial purposes. The above information is an  only. It is not intended as medical advice for individual conditions or treatments.  Talk to your doctor, nurse or pharmacist before following any medical regimen to see if it is safe and effective for you. Human Papillomavirus (HPV): Care Instructions Your Care Instructions The human papillomavirus (HPV) is a very common virus. There are many types of HPV. Some types cause the common skin wart. Other types cause genital warts, which can be spread by sexual contact. Some types can increase the risk for cervical and anal cancer. Having one type of HPV does not lead to having another type. Many women who have HPV may not know that they are infected until it is found with a Pap test. Your doctor uses this test to look for abnormal cells on your cervix. If you have had an abnormal Pap test, your doctor may recommend that you have an HPV test. 
Like a Pap test, an HPV test is done on a sample of cells collected from the cervix. If the test finds that you have the types of HPV that might lead to cancer, your doctor may suggest more tests. This does not mean that you will develop cancer; it means that you may have an increased risk. Abnormal cell changes caused by HPV often go away on their own. If the changes do not go away, they can be treated. But because HPV can stay inside the body, the abnormal cervical cells sometimes come back. This is why it is important to follow up with your doctor and have regular Pap tests. Follow-up care is a key part of your treatment and safety. Be sure to make and go to all appointments, and call your doctor if you are having problems. It's also a good idea to know your test results and keep a list of the medicines you take. How can you care for yourself at home? · If you are going to have a Pap or HPV test, do not douche or use tampons or vaginal creams in the 24 hours before the test. 
· Do not smoke. Smoking increases the risk for cervical problems and abnormal Pap tests.  If you need help quitting, talk to your doctor about stop-smoking programs and medicines. These can increase your chances of quitting for good. · Use latex condoms every time you have sex. Use them from the beginning to the end of sexual contact. · Be sure to tell your sexual partner or partners that you have HPV. Even if you do not have symptoms, you can still pass HPV to others. · Having one sex partner (who does not have STIs and does not have sex with anyone else) is a good way to avoid STIs. When should you call for help? Watch closely for changes in your health, and be sure to contact your doctor if: 
  · You have vaginal pain during or after sex.  
  · You have vaginal bleeding when you are not in your menstrual period. Where can you learn more? Go to http://racquel-rodrigo.info/. Enter F690 in the search box to learn more about \"Human Papillomavirus (HPV): Care Instructions. \" Current as of: September 11, 2018 Content Version: 11.9 © 0061-1328 [employer]. Care instructions adapted under license by [employer] (which disclaims liability or warranty for this information). If you have questions about a medical condition or this instruction, always ask your healthcare professional. [place] [address] any warranty or liability for your use of this information. Pelvic Ultrasound for Women: About This Test 
What is it? A pelvic ultrasound test uses sound waves to make a picture of the inside of the lower belly (pelvis). It allows your doctor to see your bladder, cervix, uterus, fallopian tubes, and ovaries. The sound waves create a picture on a video monitor. The test can be done in two ways: · Transabdominal. A small handheld device (transducer) is passed back and forth over your lower belly. · Transvaginal. A thin, lubricated transducer is placed in your vagina. Why is this test done? A pelvic ultrasound test is done to: · Find the cause of urinary problems. · Find out what's causing pelvic pain. · Look for causes of vaginal bleeding and menstrual problems. · Check for growths or masses like ovarian cysts or uterine fibroids. · See if a fertilized egg is growing outside the uterus. This is called a tubal pregnancy. · Confirm the stage of a pregnancy and check the baby's heartbeat. · Look for the correct placement of an intrauterine device (IUD). How can you prepare for the test? 
· If you are having a transabdominal ultrasound, your doctor will ask you to drink 4 to 6 glasses of juice or water about an hour before the test. This will fill your bladder. If you can't fill your bladder, it can be filled with water through a thin, flexible tube (catheter). · If you are having both transabdominal and transvaginal ultrasounds done, you'll start with a full bladder. You will be asked to empty it before the transvaginal ultrasound. What happens before the test? 
· You will need to remove any jewelry that might be in the way of the ultrasound. · You will need to take off most of your clothes below the waist. You'll be given a gown to wear during the test. 
What happens during the test? 
For a transabdominal ultrasound: 
· You lie down on your back on an exam table. · A warm gel will be spread on your lower belly. This improves the transmission of the sound waves. The handheld transducer is pressed against your belly and gently moved back and forth. A picture of the organs can be seen on a video monitor. For a transvaginal ultrasound: 
· You lie down on your back on an exam table with your hips slightly raised. · The tip of a thin, lubricated transducer probe is gently inserted into your vagina. The transducer may be moved around to get a complete view. The images from the test are shown on a video monitor.  
What else should you know about the test? 
· With a transabdominal ultrasound, you will feel light pressure from the transducer as it passes over your belly. If you have an injury or pelvic pain, the pressure may be painful. · With a transvaginal ultrasound, you may feel some discomfort from the transducer probe as it is put into your vagina. · You will not hear or feel the sound waves. How long does the test take? · The transabdominal ultrasound test will take about 30 minutes. · The transvaginal ultrasound test will take 15 to 30 minutes. What happens after the test? 
· You will probably be able to go home right away. · You can go back to your usual activities right away. Follow-up care is a key part of your treatment and safety. Be sure to make and go to all appointments, and call your doctor if you are having problems. It's also a good idea to keep a list of the medicines you take. Ask your doctor when you can expect to have your test results. Where can you learn more? Go to http://racquel-rodrigo.info/. Enter Z469 in the search box to learn more about \"Pelvic Ultrasound for Women: About This Test.\" Current as of: May 14, 2018 Content Version: 11.9 © 9251-5205 [employer]. Care instructions adapted under license by [employer] (which disclaims liability or warranty for this information). If you have questions about a medical condition or this instruction, always ask your healthcare professional. [address] any warranty or liability for your use of this information.

## 2019-07-17 ENCOUNTER — HOSPITAL ENCOUNTER (OUTPATIENT)
Dept: ULTRASOUND IMAGING | Age: 24
Discharge: HOME | End: 2019-07-17
Attending: OBSTETRICS & GYNECOLOGY
Payer: COMMERCIAL

## 2019-07-17 DIAGNOSIS — N92.6 IRREGULAR MENSES: ICD-10-CM

## 2019-07-17 DIAGNOSIS — N93.9 ABNORMAL UTERINE BLEEDING (AUB): ICD-10-CM

## 2019-07-17 PROCEDURE — 76856 US EXAM PELVIC COMPLETE: CPT

## 2019-07-17 PROCEDURE — 76830 TRANSVAGINAL US NON-OB: CPT

## 2019-07-19 LAB
PROLACTIN SERPL-MCNC: 33.4 NG/ML (ref 4.8–23.3)
T4 FREE SERPL-MCNC: 1.38 NG/DL (ref 0.82–1.77)
TESTOST FREE SERPL-MCNC: 2 PG/ML (ref 0–4.2)
TESTOST SERPL-MCNC: 57 NG/DL (ref 8–48)
TSH SERPL DL<=0.005 MIU/L-ACNC: 1.44 UIU/ML (ref 0.45–4.5)

## 2019-07-23 ENCOUNTER — OFFICE VISIT (OUTPATIENT)
Dept: SURGERY | Age: 24
End: 2019-07-23

## 2019-07-23 VITALS
HEART RATE: 60 BPM | BODY MASS INDEX: 35.17 KG/M2 | DIASTOLIC BLOOD PRESSURE: 61 MMHG | RESPIRATION RATE: 18 BRPM | TEMPERATURE: 98.4 F | WEIGHT: 206 LBS | SYSTOLIC BLOOD PRESSURE: 103 MMHG | OXYGEN SATURATION: 96 % | HEIGHT: 64 IN

## 2019-07-23 DIAGNOSIS — N93.9 ABNORMAL UTERINE BLEEDING (AUB): Primary | ICD-10-CM

## 2019-07-23 DIAGNOSIS — E28.2 PCOS (POLYCYSTIC OVARIAN SYNDROME): ICD-10-CM

## 2019-07-23 DIAGNOSIS — E22.1 HYPERPROLACTINEMIA (HCC): ICD-10-CM

## 2019-07-23 NOTE — PROGRESS NOTES
Abnormal bleeding note      Sebastien Jean is a 27 y.o. female for follow up of vaginal bleeding problems. Patient switched to Nuvaring at last visit. It came out, so it needs to be replaced. Otherwise was going well, with decrease in spotting. Nothing else new since last visit. Labs showed normal thyroid, elevated prolactin (33.4), and elevated testosterone. Ultrasound was normal:  Clinical indication: Irregular menses,     Transabdominal and transvaginal pelvic sonography performed. The uterus measured  8.1 x 5.5 x 3.7 cm, the stripe is 6 mm, the cervix appears unremarkable. Trace  of free fluid in the pelvis. The ovaries are well visualized by both technique,  the right ovary measured 3.9 x 2 x 2.2 cm, flow is demonstrated. The left ovary  measured 3.5 x 2.3 x 1.8 cm, flow is also demonstrated.     IMPRESSION  IMPRESSION: No significant abnormalities demonstrated. From last note:  Her current method of family planning is OCP (estrogen/progesterone). Started OCPs a couple of months ago (more than 3 months). Has been having some irregular bleeding for a couple of months. Rarely misses a dose (1-2 out of a pack).     Initially patient was regulated, but has not worked as well recently.     +cramping. Has improved with OCPs. Also occasional nausea.     Periods were irregular prior to the birth control pills. Periods are not as heavy with the OCPs, but still somewhat heavy.     Varied length with OCPs. Last about 15 days at longest.     Associated symptoms include nausea and cramping.       Her relevant past medical history:   Past Medical History:   Diagnosis Date    Asthma     Other ill-defined conditions(469.89)     ADHD    Seasonal allergies         History reviewed. No pertinent surgical history. Social History     Occupational History    Not on file   Tobacco Use    Smoking status: Never Smoker    Smokeless tobacco: Never Used   Substance and Sexual Activity    Alcohol use:  No  Drug use: Yes     Types: Marijuana    Sexual activity: Yes     Partners: Male     Birth control/protection: Condom     Family History   Problem Relation Age of Onset    Diabetes Mother     Hypertension Mother     Diabetes Father     Hypertension Father        Allergies   Allergen Reactions    Pcn [Penicillins] Hives     Prior to Admission medications    Medication Sig Start Date End Date Taking? Authorizing Provider   ethinyl estradiol-etonogestrel (NUVARING) 0.12-0.015 mg/24 hr vaginal ring Insert vaginally for 3 weeks, then remove for a week 7/3/19  Yes Scarlett Nguyen MD   ibuprofen (MOTRIN) 600 mg tablet Take 1 Tab by mouth every six (6) hours as needed for Pain. 3/30/19  Yes Guerda Borges PA   cyclobenzaprine (FLEXERIL) 10 mg tablet Take 1 Tab by mouth three (3) times daily as needed for Muscle Spasm(s). 3/30/19  Yes Guerda Borges PA   norgestimate-ethinyl estradiol (ORTHO-CYCLEN, SPRINTEC) 0.25-35 mg-mcg tab Take 1 Tab by mouth daily. 1/17/19  Yes Willa Sheets MD   cholecalciferol, VITAMIN D3, (VITAMIN D3) 5,000 unit tab tablet Take 1 Tab by mouth daily.  2/27/19   Willa Sheets MD        Review of Systems - History obtained from the patient  Constitutional: negative for weight loss, fever, night sweats  HEENT: negative for hearing loss, earache, congestion, snoring, sorethroat  CV: negative for chest pain, palpitations, edema  Resp: negative for cough, shortness of breath, wheezing  Breast: negative for breast lumps, nipple discharge, galactorrhea  GI: negative for change in bowel habits, abdominal pain, black or bloody stools  : negative for frequency, dysuria, hematuria  MSK: negative for back pain, joint pain, muscle pain  Skin: negative for itching, rash, hives  Neuro: negative for dizziness, headache, confusion, weakness  Psych: negative for anxiety, depression, change in mood  Heme/lymph: negative for bleeding, bruising, pallor      Objective:    Visit Vitals  /61 (BP 1 Location: Left arm, BP Patient Position: Sitting)   Pulse 60   Temp 98.4 °F (36.9 °C) (Oral)   Resp 18   Ht 5' 4\" (1.626 m)   Wt 206 lb (93.4 kg)   LMP 06/26/2019   SpO2 96%   BMI 35.36 kg/m²          PHYSICAL EXAMINATION    Constitutional  · Appearance: well-nourished, well developed, alert, in no acute distress    HENT  · Head and Face: appears normal    Neck  · Inspection/Palpation: normal appearance, no masses or tenderness  · Lymph Nodes: no lymphadenopathy present  · Thyroid: gland size normal, nontender, no nodules or masses present on palpation    Gastrointestinal  · Abdominal Examination: abdomen non-tender to palpation, normal bowel sounds, no masses present  · Liver and spleen: no hepatomegaly present, spleen not palpable  · Hernias: no hernias identified    Genitourinary  Deferred    Skin  · General Inspection: no rash, no lesions identified    Neurologic/Psychiatric  · Mental Status:  · Orientation: grossly oriented to person, place and time  · Mood and Affect: mood normal, affect appropriate    Assessment:   27 y.o. with AUB from PCOS and hyperprolactin    Plan:   - continue nuvaring  - repeat prolactin. Discussed how to get test drawn. If elevated, will refer to endocrine  - discussed PCOS, pathogenesis and implications. Will plan for clomid when she is ready to conceive. Instructions given to pt. Handouts given to pt. RTC PRN or for annual exam    Spent greater than 25 minutes with patient, over 50% of which was spent counselling.

## 2019-07-23 NOTE — PATIENT INSTRUCTIONS
Polycystic Ovary Syndrome: Care Instructions  Your Care Instructions  Polycystic ovary syndrome, or PCOS, means a woman's hormones are out of balance. It can cause problems with your periods and make it hard to get pregnant. Doctors don't know for sure what causes PCOS, but it seems to run in families. It also seems to be linked to obesity and a risk for diabetes. If you have PCOS, your sisters and daughters have a higher chance of getting it too. You may have other symptoms. These include weight gain, acne, too much hair growth on the face or body, high blood pressure, and high blood sugar. Your ovaries may have cysts on them. These cysts are growths filled with fluid. Keep in mind that although you may not have regular periods, you can still get pregnant. Talk to your doctor about birth control if you do not want to get pregnant. Sometimes the hormone changes with PCOS can also make it hard for some women to get pregnant. If this is a concern, talk to your doctor about treatment for this problem. Women who have PCOS can go for months or longer with no period. Your doctor may recommend medicines that can help get your cycles back to normal.  Follow-up care is a key part of your treatment and safety. Be sure to make and go to all appointments, and call your doctor if you are having problems. It's also a good idea to know your test results and keep a list of the medicines you take. How can you care for yourself at home? · Take your medicines exactly as prescribed. Call your doctor if you think you are having a problem with your medicine. · Eat a healthy diet. Include fruits, vegetables, beans, and whole grains in your diet each day. · If you are overweight, losing weight can help with many of the symptoms of PCOS. Talk to your doctor about safe ways to lose weight. · Get at least 30 minutes of exercise on most days of the week. Walking is a good choice.  Or you can run, swim, cycle, or play tennis or team sports. · For hair growth you don't want, try bleaching, plucking, electrolysis, or laser therapy. · Acne can be treated with over-the-counter medicines. Look for ones that have benzoyl peroxide or salicylic acid in them. When should you call for help? Call your doctor now or seek immediate medical care if:    · You have severe vaginal bleeding.     · You have new or worse belly or pelvic pain.    Watch closely for changes in your health, and be sure to contact your doctor if:    · You do not get better as expected.     · You have unusual vaginal bleeding. Where can you learn more? Go to http://racquel-rodrigo.info/. Enter Y921 in the search box to learn more about \"Polycystic Ovary Syndrome: Care Instructions. \"  Current as of: February 19, 2019  Content Version: 12.1  © 6885-5204 [employer]wise, [employer]. Care instructions adapted under license by [employer] (which disclaims liability or warranty for this information). If you have questions about a medical condition or this instruction, always ask your healthcare professional. [address] any warranty or liability for your use of this information.

## 2019-07-23 NOTE — PROGRESS NOTES
Chief Complaint   Patient presents with    Ultrasound     f/u     Pt presents in office for ultrasound f/u.    3 most recent PHQ Screens 7/23/2019   Little interest or pleasure in doing things Not at all   Feeling down, depressed, irritable, or hopeless Not at all   Total Score PHQ 2 0     1. Have you been to the ER, urgent care clinic since your last visit? Hospitalized since your last visit? No    2. Have you seen or consulted any other health care providers outside of the [address] [place] [address] since your last visit? Include any pap smears or colon screening.  No

## 2019-07-27 LAB — PROLACTIN SERPL-MCNC: 10.4 NG/ML (ref 4.8–23.3)

## 2019-12-09 ENCOUNTER — HOSPITAL ENCOUNTER (EMERGENCY)
Age: 24
Discharge: HOME | End: 2019-12-09
Attending: EMERGENCY MEDICINE
Payer: COMMERCIAL

## 2019-12-09 ENCOUNTER — APPOINTMENT (OUTPATIENT)
Dept: ULTRASOUND IMAGING | Age: 24
End: 2019-12-09
Payer: COMMERCIAL

## 2019-12-09 VITALS
WEIGHT: 225.97 LBS | SYSTOLIC BLOOD PRESSURE: 121 MMHG | RESPIRATION RATE: 16 BRPM | OXYGEN SATURATION: 100 % | BODY MASS INDEX: 38.58 KG/M2 | TEMPERATURE: 98.5 F | HEART RATE: 88 BPM | DIASTOLIC BLOOD PRESSURE: 72 MMHG | HEIGHT: 64 IN

## 2019-12-09 DIAGNOSIS — R10.2 PELVIC CRAMPING: ICD-10-CM

## 2019-12-09 DIAGNOSIS — N93.8 DUB (DYSFUNCTIONAL UTERINE BLEEDING): Primary | ICD-10-CM

## 2019-12-09 LAB
ALBUMIN SERPL-MCNC: 3.5 G/DL (ref 3.5–5)
ALBUMIN/GLOB SERPL: 1.1 {RATIO} (ref 1.1–2.2)
ALP SERPL-CCNC: 50 U/L (ref 45–117)
ALT SERPL-CCNC: 19 U/L (ref 12–78)
ANION GAP SERPL CALC-SCNC: 5 MMOL/L (ref 5–15)
APPEARANCE UR: CLEAR
AST SERPL-CCNC: 15 U/L (ref 15–37)
BACTERIA URNS QL MICRO: NEGATIVE /HPF
BASOPHILS # BLD: 0 K/UL (ref 0–0.1)
BASOPHILS NFR BLD: 1 % (ref 0–1)
BILIRUB SERPL-MCNC: 0.7 MG/DL (ref 0.2–1)
BILIRUB UR QL: NEGATIVE
BUN SERPL-MCNC: 10 MG/DL (ref 6–20)
BUN/CREAT SERPL: 14 (ref 12–20)
CALCIUM SERPL-MCNC: 8.8 MG/DL (ref 8.5–10.1)
CHLORIDE SERPL-SCNC: 109 MMOL/L (ref 97–108)
CLUE CELLS VAG QL WET PREP: NORMAL
CO2 SERPL-SCNC: 28 MMOL/L (ref 21–32)
COLOR UR: ABNORMAL
CREAT SERPL-MCNC: 0.73 MG/DL (ref 0.55–1.02)
DIFFERENTIAL METHOD BLD: NORMAL
EOSINOPHIL # BLD: 0 K/UL (ref 0–0.4)
EOSINOPHIL NFR BLD: 0 % (ref 0–7)
EPITH CASTS URNS QL MICRO: ABNORMAL /LPF
ERYTHROCYTE [DISTWIDTH] IN BLOOD BY AUTOMATED COUNT: 12.2 % (ref 11.5–14.5)
GLOBULIN SER CALC-MCNC: 3.3 G/DL (ref 2–4)
GLUCOSE SERPL-MCNC: 83 MG/DL (ref 65–100)
GLUCOSE UR STRIP.AUTO-MCNC: NEGATIVE MG/DL
HCG UR QL: NEGATIVE
HCT VFR BLD AUTO: 41.6 % (ref 35–47)
HGB BLD-MCNC: 13.5 G/DL (ref 11.5–16)
HGB UR QL STRIP: ABNORMAL
IMM GRANULOCYTES # BLD AUTO: 0 K/UL (ref 0–0.04)
IMM GRANULOCYTES NFR BLD AUTO: 0 % (ref 0–0.5)
KETONES UR QL STRIP.AUTO: NEGATIVE MG/DL
KOH PREP SPEC: NORMAL
LEUKOCYTE ESTERASE UR QL STRIP.AUTO: ABNORMAL
LYMPHOCYTES # BLD: 2.3 K/UL (ref 0.8–3.5)
LYMPHOCYTES NFR BLD: 40 % (ref 12–49)
MCH RBC QN AUTO: 30.5 PG (ref 26–34)
MCHC RBC AUTO-ENTMCNC: 32.5 G/DL (ref 30–36.5)
MCV RBC AUTO: 93.9 FL (ref 80–99)
MONOCYTES # BLD: 0.3 K/UL (ref 0–1)
MONOCYTES NFR BLD: 6 % (ref 5–13)
NEUTS SEG # BLD: 3.1 K/UL (ref 1.8–8)
NEUTS SEG NFR BLD: 53 % (ref 32–75)
NITRITE UR QL STRIP.AUTO: NEGATIVE
NRBC # BLD: 0 K/UL (ref 0–0.01)
NRBC BLD-RTO: 0 PER 100 WBC
PH UR STRIP: 7.5 [PH] (ref 5–8)
PLATELET # BLD AUTO: 252 K/UL (ref 150–400)
PMV BLD AUTO: 11.1 FL (ref 8.9–12.9)
POTASSIUM SERPL-SCNC: 4 MMOL/L (ref 3.5–5.1)
PROT SERPL-MCNC: 6.8 G/DL (ref 6.4–8.2)
PROT UR STRIP-MCNC: NEGATIVE MG/DL
RBC # BLD AUTO: 4.43 M/UL (ref 3.8–5.2)
RBC #/AREA URNS HPF: ABNORMAL /HPF (ref 0–5)
SERVICE CMNT-IMP: NORMAL
SODIUM SERPL-SCNC: 142 MMOL/L (ref 136–145)
SP GR UR REFRACTOMETRY: 1.02 (ref 1–1.03)
T VAGINALIS VAG QL WET PREP: NORMAL
UA: UC IF INDICATED,UAUC: ABNORMAL
UROBILINOGEN UR QL STRIP.AUTO: 1 EU/DL (ref 0.2–1)
WBC # BLD AUTO: 5.9 K/UL (ref 3.6–11)
WBC URNS QL MICRO: ABNORMAL /HPF (ref 0–4)

## 2019-12-09 PROCEDURE — 36415 COLL VENOUS BLD VENIPUNCTURE: CPT

## 2019-12-09 PROCEDURE — 87086 URINE CULTURE/COLONY COUNT: CPT

## 2019-12-09 PROCEDURE — 81001 URINALYSIS AUTO W/SCOPE: CPT

## 2019-12-09 PROCEDURE — 85025 COMPLETE CBC W/AUTO DIFF WBC: CPT

## 2019-12-09 PROCEDURE — 81025 URINE PREGNANCY TEST: CPT

## 2019-12-09 PROCEDURE — 87210 SMEAR WET MOUNT SALINE/INK: CPT

## 2019-12-09 PROCEDURE — 87147 CULTURE TYPE IMMUNOLOGIC: CPT

## 2019-12-09 PROCEDURE — 76830 TRANSVAGINAL US NON-OB: CPT

## 2019-12-09 PROCEDURE — 80053 COMPREHEN METABOLIC PANEL: CPT

## 2019-12-09 PROCEDURE — 74011250637 HC RX REV CODE- 250/637: Performed by: PHYSICIAN ASSISTANT

## 2019-12-09 PROCEDURE — 76856 US EXAM PELVIC COMPLETE: CPT

## 2019-12-09 PROCEDURE — 99284 EMERGENCY DEPT VISIT MOD MDM: CPT

## 2019-12-09 PROCEDURE — 87491 CHLMYD TRACH DNA AMP PROBE: CPT

## 2019-12-09 RX ORDER — MEDROXYPROGESTERONE ACETATE 10 MG/1
10 TABLET ORAL DAILY
Qty: 10 TAB | Refills: 0 | Status: SHIPPED | OUTPATIENT
Start: 2019-12-09 | End: 2019-12-19

## 2019-12-09 RX ORDER — MEDROXYPROGESTERONE ACETATE 10 MG/1
10 TABLET ORAL
Status: COMPLETED | OUTPATIENT
Start: 2019-12-09 | End: 2019-12-09

## 2019-12-09 RX ADMIN — MEDROXYPROGESTERONE ACETATE 10 MG: 10 TABLET ORAL at 17:57

## 2019-12-09 NOTE — ED NOTES
Patient being taken to Ultrasound by US Tech via wheelchair at this time. Will perform pelvic with HAYDEE Booker when pt returns.

## 2019-12-09 NOTE — DISCHARGE INSTRUCTIONS
Rest, push fluids, follow up with your Ob/Gyn for recheck at your earliest opportunity. Return to the Emergency Dept for any worsening pain, bleeding, shortness of breath, weakness or fever.

## 2019-12-09 NOTE — LETTER
12/12/2019 Elma Baptiste [address] [address] Dear Ms. Elenaedson Acuna, You were recently seen in the Emergency Department of [address] and had lab work performed. We would like to discuss these results with you. Please call the Emergency Department at your earliest convenience at [phone] between 10am-8pm to speak with one of our providers. Sincerely, HAYDEE Painter 
 
 
[facility] EMERGENCY DEPT 
[address] [place] [address] 
[phone]

## 2019-12-09 NOTE — LETTER
[address] 
\[facility]\"" EMERGENCY DEPT 
[address] [facility] [place] [address] 
[phone] Work/School Note Date: 12/9/2019 To Whom It May concern: 
 
Fatimah Díaz was seen and treated today in the emergency room. She may return to work in 2 to 3 days, as symptoms improve. Sincerely, Diaz Minayama

## 2019-12-09 NOTE — ED NOTES
HAYDEE Harman at bedside to provide discharge paperwork. Vital signs stable. Pt in no apparent distress at this time. Mental status at baseline. Ambulatory to waiting room with steady gate, discharge paperwork in hand. Patient and or family acknowledged and signed discharge instructions that were created/provided by the Physician. Signed discharge form with patient label placed in scan box. Accompanied by family to drive pt home.

## 2019-12-10 NOTE — ED PROVIDER NOTES
EMERGENCY DEPARTMENT HISTORY AND PHYSICAL EXAM      Date: 12/9/2019  Patient Name: Gabriella Meza    History of Presenting Illness     Chief Complaint   Patient presents with    Vaginal Bleeding     pt reports vaginal bleeding x30 days, has had birth control changed however continues with bleeding, some abdominal cramps       History Provided By: Patient and patient's mother    HPI: Gabriella Meza, 27 y.o. female presents ambulatory to the Emergency Dept with concern for irregular menstrual bleeding x 1 month. Pt reports h/o same with placement on multiple birth control/progesterone agents to correct at that time. She sees Dr. Kelton Holbrook for Ob/Gyn care. She denied concern for pregnancy. She denied h/o anemia. She reports the bleeding has been heavy throughout the month. She denied pelvic pain beyond mild cramping. No dysuria. No vaginal discharge or lesion noted. She states she has not been seen by her Ob/Gyn for this episode of irregular bleeding. Pt is o/w healthy without fever, chills, cough, congestion, ST, shortness of breath, chest pain, N/V/D. Chief Complaint: vaginal bleeding  Duration: 1 Months  Timing:  Constant  Location: pelvis  Quality: Cramping  Severity: Mild to moderate  Modifying Factors: pt with h/o irregular menstrual bleeding in the past  Associated Symptoms: denies any other associated signs or symptoms        There are no other complaints, changes, or physical findings at this time. PCP: Lucian Haines MD    Current Outpatient Medications   Medication Sig Dispense Refill    medroxyPROGESTERone (PROVERA) 10 mg tablet Take 1 Tab by mouth daily for 10 days. 10 Tab 0    ethinyl estradiol-etonogestrel (NUVARING) 0.12-0.015 mg/24 hr vaginal ring Insert vaginally for 3 weeks, then remove for a week 1 Device 12    ibuprofen (MOTRIN) 600 mg tablet Take 1 Tab by mouth every six (6) hours as needed for Pain.  20 Tab 0       Past History     Past Medical History:  Past Medical History: Diagnosis Date    Asthma     Ill-defined condition     Endometriosis    Other ill-defined conditions(799.89)     ADHD    Seasonal allergies        Past Surgical History:  History reviewed. No pertinent surgical history. Family History:  Family History   Problem Relation Age of Onset    Diabetes Mother     Hypertension Mother    [de-identified] Diabetes Father     Hypertension Father        Social History:  Social History     Tobacco Use    Smoking status: Never Smoker    Smokeless tobacco: Never Used   Substance Use Topics    Alcohol use: No    Drug use: Yes     Types: Marijuana       Allergies: Allergies   Allergen Reactions    Pcn [Penicillins] Hives         Review of Systems   Review of Systems   Constitutional: Negative for chills and fever. HENT: Negative for congestion, rhinorrhea and sore throat. Respiratory: Negative for cough and shortness of breath. Cardiovascular: Negative for chest pain and palpitations. Gastrointestinal: Negative for diarrhea, nausea and vomiting. Endocrine: Negative for polydipsia, polyphagia and polyuria. Genitourinary: Positive for vaginal bleeding. Negative for decreased urine volume, difficulty urinating, dysuria, frequency, genital sores, hematuria, pelvic pain, vaginal discharge and vaginal pain. Musculoskeletal: Negative for neck pain and neck stiffness. Skin: Negative for pallor, rash and wound. Allergic/Immunologic: Negative for food allergies and immunocompromised state. Neurological: Negative for dizziness and headaches. Hematological: Negative for adenopathy. Does not bruise/bleed easily. Psychiatric/Behavioral: Negative for agitation and confusion. Physical Exam   Physical Exam  Vitals signs and nursing note reviewed. Constitutional:       General: She is not in acute distress. Appearance: Normal appearance. She is well-developed and normal weight. She is not diaphoretic. HENT:      Head: Normocephalic and atraumatic.       Nose: Nose normal.      Mouth/Throat:      Mouth: Mucous membranes are moist.      Pharynx: No oropharyngeal exudate. Eyes:      General: No scleral icterus. Right eye: No discharge. Left eye: No discharge. Conjunctiva/sclera: Conjunctivae normal.   Neck:      Musculoskeletal: Normal range of motion and neck supple. Thyroid: No thyromegaly. Vascular: No JVD. Trachea: No tracheal deviation. Cardiovascular:      Rate and Rhythm: Normal rate and regular rhythm. Pulses: Normal pulses. Heart sounds: Normal heart sounds. Pulmonary:      Effort: Pulmonary effort is normal. No respiratory distress. Breath sounds: Normal breath sounds. No wheezing. Abdominal:      General: There is no distension. Palpations: Abdomen is soft. There is no mass. Tenderness: There is no tenderness. Musculoskeletal: Normal range of motion. Right lower leg: No edema. Left lower leg: No edema. Lymphadenopathy:      Cervical: No cervical adenopathy. Skin:     General: Skin is warm and dry. Coloration: Skin is not pale. Neurological:      General: No focal deficit present. Mental Status: She is alert and oriented to person, place, and time. Motor: No abnormal muscle tone. Coordination: Coordination normal.   Psychiatric:         Mood and Affect: Mood normal.         Behavior: Behavior normal.         Judgment: Judgment normal.       Pelvic Exam  Date/Time: 12/9/2019 5:00 PM  Performed by: PA  Procedure duration:  15 minutes. Documented by:  Javy Beltrán PA-C. Exam assisted by:  Cecilia Luna RN. Type of exam performed: bimanual and speculum. External genitalia appearance: normal.    Vaginal exam:  bleeding. Bleeding: mild  Cervical exam:  no cervical motion tenderness, os closed and active bleeding from os. Specimen(s) collected:  chlamydia and GC.   Bimanual exam:  normal.    Patient tolerance: Patient tolerated the procedure well with no immediate complications          Diagnostic Study Results     Labs -     Recent Results (from the past 12 hour(s))   CBC WITH AUTOMATED DIFF    Collection Time: 12/09/19  2:55 PM   Result Value Ref Range    WBC 5.9 3.6 - 11.0 K/uL    RBC 4.43 3.80 - 5.20 M/uL    HGB 13.5 11.5 - 16.0 g/dL    HCT 41.6 35.0 - 47.0 %    MCV 93.9 80.0 - 99.0 FL    MCH 30.5 26.0 - 34.0 PG    MCHC 32.5 30.0 - 36.5 g/dL    RDW 12.2 11.5 - 14.5 %    PLATELET 458 571 - 766 K/uL    MPV 11.1 8.9 - 12.9 FL    NRBC 0.0 0  WBC    ABSOLUTE NRBC 0.00 0.00 - 0.01 K/uL    NEUTROPHILS 53 32 - 75 %    LYMPHOCYTES 40 12 - 49 %    MONOCYTES 6 5 - 13 %    EOSINOPHILS 0 0 - 7 %    BASOPHILS 1 0 - 1 %    IMMATURE GRANULOCYTES 0 0.0 - 0.5 %    ABS. NEUTROPHILS 3.1 1.8 - 8.0 K/UL    ABS. LYMPHOCYTES 2.3 0.8 - 3.5 K/UL    ABS. MONOCYTES 0.3 0.0 - 1.0 K/UL    ABS. EOSINOPHILS 0.0 0.0 - 0.4 K/UL    ABS. BASOPHILS 0.0 0.0 - 0.1 K/UL    ABS. IMM. GRANS. 0.0 0.00 - 0.04 K/UL    DF AUTOMATED     METABOLIC PANEL, COMPREHENSIVE    Collection Time: 12/09/19  2:55 PM   Result Value Ref Range    Sodium 142 136 - 145 mmol/L    Potassium 4.0 3.5 - 5.1 mmol/L    Chloride 109 (H) 97 - 108 mmol/L    CO2 28 21 - 32 mmol/L    Anion gap 5 5 - 15 mmol/L    Glucose 83 65 - 100 mg/dL    BUN 10 6 - 20 MG/DL    Creatinine 0.73 0.55 - 1.02 MG/DL    BUN/Creatinine ratio 14 12 - 20      GFR est AA >60 >60 ml/min/1.73m2    GFR est non-AA >60 >60 ml/min/1.73m2    Calcium 8.8 8.5 - 10.1 MG/DL    Bilirubin, total 0.7 0.2 - 1.0 MG/DL    ALT (SGPT) 19 12 - 78 U/L    AST (SGOT) 15 15 - 37 U/L    Alk.  phosphatase 50 45 - 117 U/L    Protein, total 6.8 6.4 - 8.2 g/dL    Albumin 3.5 3.5 - 5.0 g/dL    Globulin 3.3 2.0 - 4.0 g/dL    A-G Ratio 1.1 1.1 - 2.2     URINALYSIS W/ REFLEX CULTURE    Collection Time: 12/09/19  2:55 PM   Result Value Ref Range    Color YELLOW/STRAW      Appearance CLEAR CLEAR      Specific gravity 1.022 1.003 - 1.030      pH (UA) 7.5 5.0 - 8.0      Protein NEGATIVE  NEG mg/dL    Glucose NEGATIVE  NEG mg/dL    Ketone NEGATIVE  NEG mg/dL    Bilirubin NEGATIVE  NEG      Blood MODERATE (A) NEG      Urobilinogen 1.0 0.2 - 1.0 EU/dL    Nitrites NEGATIVE  NEG      Leukocyte Esterase SMALL (A) NEG      WBC 5-10 0 - 4 /hpf    RBC 0-5 0 - 5 /hpf    Epithelial cells FEW FEW /lpf    Bacteria NEGATIVE  NEG /hpf    UA:UC IF INDICATED URINE CULTURE ORDERED (A) CNI     HCG URINE, QL. - POC    Collection Time: 12/09/19  2:57 PM   Result Value Ref Range    Pregnancy test,urine (POC) NEGATIVE  NEG     KOH, OTHER SOURCES    Collection Time: 12/09/19  5:10 PM   Result Value Ref Range    Special Requests: NO SPECIAL REQUESTS      KOH NO YEAST SEEN     WET PREP    Collection Time: 12/09/19  5:10 PM   Result Value Ref Range    Clue cells CLUE CELLS ABSENT      Wet prep NO TRICHOMONAS SEEN         Radiologic Studies -   US TRANSVAGINAL   Final Result   IMPRESSION: Trace free fluid in pelvic cul-de-sac. Otherwise, unremarkable   study. Yodit Felton US PELV NON OBS   Final Result   IMPRESSION: Trace free fluid in pelvic cul-de-sac. Otherwise, unremarkable   study. .              Medical Decision Making   I am the first provider for this patient. I reviewed the vital signs, available nursing notes, past medical history, past surgical history, family history and social history. Vital Signs-Reviewed the patient's vital signs. Patient Vitals for the past 12 hrs:   Temp Pulse Resp BP SpO2   12/09/19 1600    121/72 100 %   12/09/19 1532     99 %   12/09/19 1530    124/77    12/09/19 1429 98.5 °F (36.9 °C) 88 16 (!) 148/95 100 %         Records Reviewed: Nursing Notes, Old Medical Records, Previous Radiology Studies and Previous Laboratory Studies    Provider Notes (Medical Decision Making):   DUB, ovarian cyst, uterine fibroid, mass/lesion    ED Course:   Initial assessment performed.  The patients presenting problems have been discussed, and they are in agreement with the care plan formulated and outlined with them. I have encouraged them to ask questions as they arise throughout their visit. DISCHARGE NOTE:  The care plan has been outline with the patient and/or family, who verbally conveyed understanding and agreement. Available results have been reviewed. Patient and/or family understand the follow up plan as outlined and discharge instructions. Should their condition deterioration at any time after discharge the patient agrees to return, follow up sooner than outlined or seek medical assistance at the closest Emergency Room as soon as possible. Questions have been answered. Discharge instructions and educational information regarding the patient's diagnosis as well a list of reasons why the patient would want to seek immediate medical attention, should their condition change, were reviewed directly with the patient/family       PLAN:  1. Discharge Medication List as of 12/9/2019  5:33 PM      START taking these medications    Details   medroxyPROGESTERone (PROVERA) 10 mg tablet Take 1 Tab by mouth daily for 10 days. , Print, Disp-10 Tab, R-0         CONTINUE these medications which have NOT CHANGED    Details   ethinyl estradiol-etonogestrel (NUVARING) 0.12-0.015 mg/24 hr vaginal ring Insert vaginally for 3 weeks, then remove for a week, Normal, Disp-1 Device, R-12      ibuprofen (MOTRIN) 600 mg tablet Take 1 Tab by mouth every six (6) hours as needed for Pain., Normal, Disp-20 Tab, R-0           2. Follow-up Information     Follow up With Specialties Details Why Contact Info    Dr. Humphrey Munroe, Ob/Gyn        [facility] Emergency Medicine  If symptoms worsen [address]  [phone]        Return to ED if worse     Diagnosis     Clinical Impression:   1. DUB (dysfunctional uterine bleeding)    2.  Pelvic cramping

## 2019-12-11 LAB
BACTERIA SPEC CULT: ABNORMAL
BACTERIA SPEC CULT: ABNORMAL
C TRACH DNA SPEC QL NAA+PROBE: POSITIVE
CC UR VC: ABNORMAL
N GONORRHOEA DNA SPEC QL NAA+PROBE: NEGATIVE
SAMPLE TYPE: ABNORMAL
SERVICE CMNT-IMP: ABNORMAL
SERVICE CMNT-IMP: ABNORMAL
SPECIMEN SOURCE: ABNORMAL

## 2019-12-12 NOTE — PROGRESS NOTES
Patient was not empirically treated in the ED. She needs Rx for Azithromycin 1000 mg PO x 1 dose. I just attempted to contact the patient. The home number rings but unable to leave voicemail. The mobile number has a VM for someone that is not the patient or listed emergency contact thus no message left. Will send letter.

## 2020-01-14 RX ORDER — AZITHROMYCIN 500 MG/1
1000 TABLET, FILM COATED ORAL
Qty: 2 TAB | Refills: 0 | Status: SHIPPED | OUTPATIENT
Start: 2020-01-14 | End: 2020-01-14

## 2020-01-14 NOTE — PROGRESS NOTES
Pt received a letter regarding +CT swab at early December visit. She did not receive an abx. Azithromycin sent to her pharmacy. Recommended f/u with GYN for AVI and advised pt to inform partners.

## 2020-03-03 ENCOUNTER — APPOINTMENT (OUTPATIENT)
Dept: GENERAL RADIOLOGY | Age: 25
End: 2020-03-03
Attending: EMERGENCY MEDICINE
Payer: COMMERCIAL

## 2020-03-03 ENCOUNTER — HOSPITAL ENCOUNTER (EMERGENCY)
Age: 25
Discharge: HOME | End: 2020-03-03
Attending: EMERGENCY MEDICINE
Payer: COMMERCIAL

## 2020-03-03 VITALS
DIASTOLIC BLOOD PRESSURE: 68 MMHG | WEIGHT: 221.78 LBS | BODY MASS INDEX: 37.86 KG/M2 | TEMPERATURE: 100.8 F | SYSTOLIC BLOOD PRESSURE: 131 MMHG | RESPIRATION RATE: 20 BRPM | OXYGEN SATURATION: 99 % | HEART RATE: 100 BPM | HEIGHT: 64 IN

## 2020-03-03 DIAGNOSIS — J11.1 INFLUENZA-LIKE ILLNESS: Primary | ICD-10-CM

## 2020-03-03 DIAGNOSIS — Z87.09 HISTORY OF ASTHMA: ICD-10-CM

## 2020-03-03 LAB
DEPRECATED S PYO AG THROAT QL EIA: NEGATIVE
FLUAV AG NPH QL IA: NEGATIVE
FLUBV AG NOSE QL IA: NEGATIVE

## 2020-03-03 PROCEDURE — 74011250637 HC RX REV CODE- 250/637: Performed by: PHYSICIAN ASSISTANT

## 2020-03-03 PROCEDURE — 87880 STREP A ASSAY W/OPTIC: CPT

## 2020-03-03 PROCEDURE — 99283 EMERGENCY DEPT VISIT LOW MDM: CPT

## 2020-03-03 PROCEDURE — 87804 INFLUENZA ASSAY W/OPTIC: CPT

## 2020-03-03 PROCEDURE — 87070 CULTURE OTHR SPECIMN AEROBIC: CPT

## 2020-03-03 PROCEDURE — 71046 X-RAY EXAM CHEST 2 VIEWS: CPT

## 2020-03-03 RX ORDER — PROMETHAZINE HYDROCHLORIDE AND DEXTROMETHORPHAN HYDROBROMIDE 6.25; 15 MG/5ML; MG/5ML
5 SYRUP ORAL
Qty: 180 ML | Refills: 0 | Status: SHIPPED | OUTPATIENT
Start: 2020-03-03 | End: 2020-03-10

## 2020-03-03 RX ORDER — OSELTAMIVIR PHOSPHATE 75 MG/1
75 CAPSULE ORAL 2 TIMES DAILY
Qty: 10 CAP | Refills: 0 | Status: SHIPPED | OUTPATIENT
Start: 2020-03-03 | End: 2020-03-08

## 2020-03-03 RX ORDER — IBUPROFEN 600 MG/1
600 TABLET ORAL
Qty: 20 TAB | Refills: 0 | Status: SHIPPED | OUTPATIENT
Start: 2020-03-03 | End: 2021-08-18 | Stop reason: SDUPTHER

## 2020-03-03 RX ORDER — FLUTICASONE PROPIONATE 50 MCG
2 SPRAY, SUSPENSION (ML) NASAL DAILY
Qty: 1 BOTTLE | Refills: 0 | Status: SHIPPED | OUTPATIENT
Start: 2020-03-03 | End: 2021-08-18 | Stop reason: ALTCHOICE

## 2020-03-03 RX ADMIN — IBUPROFEN 600 MG: 400 TABLET, FILM COATED ORAL at 13:26

## 2020-03-03 NOTE — ED NOTES
Gabriel HUBBARD and Rachel Robles RN reviewed discharge instructions with the patient. The patient verbalized understanding. All questions and concerns were addressed. The patient declined a wheelchair and is discharged ambulatory in the care of family members with instructions and prescriptions in hand. Pt is alert and oriented x 4. Respirations are clear and unlabored.

## 2020-03-03 NOTE — ED PROVIDER NOTES
EMERGENCY DEPARTMENT HISTORY AND PHYSICAL EXAM      Date: 3/3/2020  Patient Name: Cristin Murphy    History of Presenting Illness     Chief Complaint   Patient presents with    Fever     Tmax 101.8 onset five days with n/v/d/cough and congestion       History Provided By: Patient    HPI: Cristin Murphy, 27 y.o. female with PMHx significant for asthma. Patient presents emergency department with flulike symptoms. States that she is been having body aches, chills, nasal congestion, productive cough that has been persisting for the past day. She denies  any medications for symptoms. Denies any chest pain, shortness of breath, abdominal pain, flank pain,  symptoms or there are no other complaints, changes, or physical findings at this time. Please note for examination and HPI were completed I did introduce myself as the physician assistant. Please note that this dictation was completed with [employer], the [employer] voice recognition software. Quite often unanticipated grammatical, syntax, homophones, and other interpretive errors are inadvertently transcribed by the computer software. Please disregard these errors. Please excuse any errors that have escaped final proofreading. For further clarification on any chart please contact myself. Thank you. PCP: Zhane Martinez MD    No current facility-administered medications on file prior to encounter. Current Outpatient Medications on File Prior to Encounter   Medication Sig Dispense Refill    ethinyl estradiol-etonogestrel (NUVARING) 0.12-0.015 mg/24 hr vaginal ring Insert vaginally for 3 weeks, then remove for a week 1 Device 12    ibuprofen (MOTRIN) 600 mg tablet Take 1 Tab by mouth every six (6) hours as needed for Pain.  20 Tab 0       Past History     Past Medical History:  Past Medical History:   Diagnosis Date    Asthma     Ill-defined condition     Endometriosis    Other ill-defined conditions(799.74)     ADHD    Seasonal allergies Past Surgical History:  No past surgical history on file. Family History:  Family History   Problem Relation Age of Onset    Diabetes Mother     Hypertension Mother    [facility] Diabetes Father     Hypertension Father        Social History:  Social History     Tobacco Use    Smoking status: Never Smoker    Smokeless tobacco: Never Used   Substance Use Topics    Alcohol use: No    Drug use: Yes     Types: Marijuana       Allergies: Allergies   Allergen Reactions    Pcn [Penicillins] Hives         Review of Systems   Review of Systems   Constitutional: Positive for fever. HENT: Positive for congestion, sinus pressure and sneezing. Respiratory: Positive for cough. Musculoskeletal: Positive for myalgias. All other systems reviewed and are negative. Physical Exam   Physical Exam  Vitals signs and nursing note reviewed. Constitutional:       Appearance: She is well-developed. HENT:      Head: Normocephalic and atraumatic. Right Ear: There is no impacted cerumen. Left Ear: There is no impacted cerumen. Nose: Congestion and rhinorrhea present. Mouth/Throat:      Mouth: Mucous membranes are moist.      Pharynx: No oropharyngeal exudate or posterior oropharyngeal erythema. Eyes:      Conjunctiva/sclera: Conjunctivae normal.      Pupils: Pupils are equal, round, and reactive to light. Neck:      Musculoskeletal: Normal range of motion and neck supple. No neck rigidity. Thyroid: No thyromegaly. Cardiovascular:      Rate and Rhythm: Normal rate and regular rhythm. Heart sounds: Normal heart sounds. No murmur. Pulmonary:      Effort: Pulmonary effort is normal. No respiratory distress. Breath sounds: Normal breath sounds. No stridor. No wheezing. Abdominal:      General: Bowel sounds are normal.      Palpations: Abdomen is soft. Tenderness: There is no abdominal tenderness. Musculoskeletal: Normal range of motion. General: No tenderness. Lymphadenopathy:      Cervical: No cervical adenopathy. Skin:     General: Skin is warm. Neurological:      Mental Status: She is alert and oriented to person, place, and time. Deep Tendon Reflexes: Reflexes are normal and symmetric. Psychiatric:         Judgment: Judgment normal.         Diagnostic Study Results     Labs -     Recent Results (from the past 12 hour(s))   INFLUENZA A+B VIRAL AGS    Collection Time: 03/03/20 12:57 PM   Result Value Ref Range    Influenza A Antigen NEGATIVE  NEG      Influenza B Antigen NEGATIVE  NEG     STREP AG SCREEN, GROUP A    Collection Time: 03/03/20 12:57 PM   Result Value Ref Range    Group A Strep Ag ID NEGATIVE  NEG         Radiologic Studies -   XR CHEST PA LAT   Final Result        CT Results  (Last 48 hours)    None        CXR Results  (Last 48 hours)               03/03/20 1317  XR CHEST PA LAT Final result    Impression:  IMPRESSION: No acute intrathoracic disease. Narrative:  CLINICAL HISTORY: Bodyaches, fever    INDICATION: Cough       COMPARISON: 2011       FINDINGS:    PA and lateral views of the chest are obtained. The cardiopericardial silhouette is within normal limits. There is no pleural   effusion, pneumothorax or focal consolidation present. Medical Decision Making   I am the first provider for this patient. I reviewed the vital signs, available nursing notes, past medical history, past surgical history, family history and social history. Vital Signs-Reviewed the patient's vital signs. Patient Vitals for the past 12 hrs:   Temp Pulse Resp BP SpO2   03/03/20 1254 (!) 100.8 °F (38.2 °C) 100 20 131/68 99 %       Records Reviewed: Nursing Notes    Provider Notes (Medical Decision Making): At this time patient does not have any meningismus, photophobia, nausea, vomiting, headache fever, neck or back pain significant for meningitis.   Patient does not have any adventurous lung sounds, chest pain, shortness of breath, low SPO2 or tachycardia low clinical suspicion for pneumonia. Patient is not Centor criteria score positive low clinical suspicion for strep pharyngitis as there is no exudates as well. At this time patient is advised that we will go ahead and give symptomatic treatment return for any new or worsening complications we will currently treat as viral upper respiratory infection. Although patient did test negative for any findings on chest x-ray she did not have any bubbling noted and lower suspicion for pneumonia. Her flu test were also negative however as she has had acute onset of body aches, low-grade fever, chills there is no other acute complaints we will go ahead and treat as acute urinary tract infection discharge patient stable condition to follow-up on outpatient basis. She is with inside the timeframe Tamiflu is known to be effective was offered this medication today. ED Course:   Initial assessment performed. The patients presenting problems have been discussed, and they are in agreement with the care plan formulated and outlined with them. I have encouraged them to ask questions as they arise throughout their visit. Critical Care Time: None    Disposition:  Dispo     PLAN:  1. Current Discharge Medication List      START taking these medications    Details   fluticasone propionate (FLONASE) 50 mcg/actuation nasal spray 2 Sprays by Both Nostrils route daily. Qty: 1 Bottle, Refills: 0      promethazine-dextromethorphan (PROMETHAZINE-DM) 6.25-15 mg/5 mL syrup Take 5 mL by mouth every four (4) hours as needed for Cough for up to 7 days. Qty: 180 mL, Refills: 0      !! ibuprofen (MOTRIN) 600 mg tablet Take 1 Tab by mouth every six (6) hours as needed for Pain. Qty: 20 Tab, Refills: 0      oseltamivir (TAMIFLU) 75 mg capsule Take 1 Cap by mouth two (2) times a day for 5 days. Qty: 10 Cap, Refills: 0       !! - Potential duplicate medications found. Please discuss with provider. CONTINUE these medications which have NOT CHANGED    Details   ethinyl estradiol-etonogestrel (NUVARING) 0.12-0.015 mg/24 hr vaginal ring Insert vaginally for 3 weeks, then remove for a week  Qty: 1 Device, Refills: 12      !! ibuprofen (MOTRIN) 600 mg tablet Take 1 Tab by mouth every six (6) hours as needed for Pain. Qty: 20 Tab, Refills: 0       !! - Potential duplicate medications found. Please discuss with provider. 2.   Follow-up Information     Follow up With Specialties Details Why Contact Info    Melodie Perez MD Internal Medicine   [id number]  [phone]          Return to ED if worse     Diagnosis     Clinical Impression:   1. Influenza-like illness    2. History of asthma          Please note that this dictation was completed with [employer], the computer voice recognition software. Quite often unanticipated grammatical, syntax, homophones, and other interpretive errors are inadvertently transcribed by the computer software. Please disregards these errors. Please excuse any errors that have escaped final proofreading. This note will not be viewable in [address]

## 2020-03-05 LAB
BACTERIA SPEC CULT: NORMAL
SERVICE CMNT-IMP: NORMAL

## 2021-08-18 ENCOUNTER — VIRTUAL VISIT (OUTPATIENT)
Dept: FAMILY MEDICINE CLINIC | Age: 26
End: 2021-08-18
Payer: COMMERCIAL

## 2021-08-18 DIAGNOSIS — R11.2 NON-INTRACTABLE VOMITING WITH NAUSEA, UNSPECIFIED VOMITING TYPE: ICD-10-CM

## 2021-08-18 DIAGNOSIS — J40 BRONCHITIS DUE TO COVID-19 VIRUS: ICD-10-CM

## 2021-08-18 DIAGNOSIS — U07.1 LAB TEST POSITIVE FOR DETECTION OF COVID-19 VIRUS: Primary | ICD-10-CM

## 2021-08-18 DIAGNOSIS — U07.1 BRONCHITIS DUE TO COVID-19 VIRUS: ICD-10-CM

## 2021-08-18 PROCEDURE — 99214 OFFICE O/P EST MOD 30 MIN: CPT | Performed by: INTERNAL MEDICINE

## 2021-08-18 RX ORDER — AZITHROMYCIN 250 MG/1
TABLET, FILM COATED ORAL
Qty: 6 TABLET | Refills: 0 | Status: SHIPPED | OUTPATIENT
Start: 2021-08-18 | End: 2021-08-23

## 2021-08-18 RX ORDER — ASCORBIC ACID 500 MG
500 TABLET ORAL DAILY
Qty: 30 TABLET | Refills: 0 | Status: SHIPPED | OUTPATIENT
Start: 2021-08-18

## 2021-08-18 RX ORDER — ONDANSETRON 4 MG/1
4 TABLET, ORALLY DISINTEGRATING ORAL
Qty: 30 TABLET | Refills: 0 | Status: SHIPPED | OUTPATIENT
Start: 2021-08-18

## 2021-08-18 RX ORDER — PROMETHAZINE HYDROCHLORIDE AND DEXTROMETHORPHAN HYDROBROMIDE 6.25; 15 MG/5ML; MG/5ML
5 SYRUP ORAL
Qty: 120 ML | Refills: 0 | Status: SHIPPED | OUTPATIENT
Start: 2021-08-18 | End: 2021-08-25

## 2021-08-18 NOTE — PROGRESS NOTES
Chief Complaint   Patient presents with   [facility] to be tested      HPI:  Zulma Jimenez is a 27 y.o. female who was seen by synchronous (real-time) audio-video technology on 8/18/2021 for Positive For Covid-19 [facility] to be tested )    Assessment & Plan:   Diagnoses and all orders for this visit:    1. Lab test positive for detection of COVID-19 virus  -     azithromycin (ZITHROMAX) 250 mg tablet; Take 2 tablets today, then take 1 tablet daily    2. Bronchitis due to COVID-19 virus  -     azithromycin (ZITHROMAX) 250 mg tablet; Take 2 tablets today, then take 1 tablet daily  -     promethazine-dextromethorphan (PROMETHAZINE-DM) 6.25-15 mg/5 mL syrup; Take 5 mL by mouth every four (4) hours as needed for Cough for up to 7 days. -     ascorbic acid, vitamin C, (VITAMIN C) 500 mg tablet; Take 1 Tablet by mouth daily. -     zinc 50 mg tab tablet; Take 1 Tablet by mouth daily. 3. Non-intractable vomiting with nausea, unspecified vomiting type  -     ondansetron (ZOFRAN ODT) 4 mg disintegrating tablet; Take 1 Tablet by mouth every eight (8) hours as needed for Nausea or Nausea or Vomiting. I spent at least 20 minutes on this visit with this established patient. 712  Subjective:   Zulma Jimenez is a 27 y.o. AA female with h/o asthma tested positive for covid in an urgent care 4 days prior. Current symptoms include productive cough, low grade fever/chills, loss of taste & smell, nausea/vomiting. Denies sob,  She is current using her inhaler for asthma. Prior to Admission medications    Medication Sig Start Date End Date Taking? Authorizing Provider   ethinyl estradiol-etonogestrel (NUVARING) 0.12-0.015 mg/24 hr vaginal ring Insert vaginally for 3 weeks, then remove for a week 7/3/19  Yes Jon Flores MD   ibuprofen (MOTRIN) 600 mg tablet Take 1 Tab by mouth every six (6) hours as needed for Pain.  3/30/19  Yes Yoni Borges PA   fluticasone propionate (FLONASE) 50 mcg/actuation nasal spray 2 Sprays by Both Nostrils route daily. Patient not taking: Reported on 8/18/2021 3/3/20 8/18/21  Jj Tineo PA-C   ibuprofen (MOTRIN) 600 mg tablet Take 1 Tab by mouth every six (6) hours as needed for Pain. Patient not taking: Reported on 8/18/2021 3/3/20 8/18/21  Jj Tineo PA-C     Patient Active Problem List   Diagnosis Code    Menometrorrhagia N92.1    Oligomenorrhea N91.5    Morbid obesity with BMI of 45.0-49.9, adult ([facility] E66.01, Z68.42     Current Outpatient Medications   Medication Sig Dispense Refill    ethinyl estradiol-etonogestrel (NUVARING) 0.12-0.015 mg/24 hr vaginal ring Insert vaginally for 3 weeks, then remove for a week 1 Device 12    ibuprofen (MOTRIN) 600 mg tablet Take 1 Tab by mouth every six (6) hours as needed for Pain. 20 Tab 0     Allergies   Allergen Reactions    Pcn [Penicillins] Hives     Past Medical History:   Diagnosis Date    Asthma     Ill-defined condition     Endometriosis    Other ill-defined conditions(229.37)     ADHD    Seasonal allergies      History reviewed. No pertinent surgical history.   Family History   Problem Relation Age of Onset    Diabetes Mother     Hypertension Mother     Diabetes Father     Hypertension Father      Social History     Tobacco Use    Smoking status: Never Smoker    Smokeless tobacco: Never Used   Substance Use Topics    Alcohol use: No     ROS:  As per hpi    Objective:     Patient-Reported Vitals 8/18/2021   Patient-Reported Temperature 96.5      General: alert, cooperative, no distress   Mental  status: normal mood, behavior, speech, dress, motor activity, and thought processes, able to follow commands   HENT: NCAT   Neck: no visualized mass   Resp: no respiratory distress   Neuro: no gross deficits   Skin: no discoloration or lesions of concern on visible areas   Psychiatric: normal affect, consistent with stated mood, no evidence of hallucinations     Additional exam findings: We discussed the expected course, resolution and complications of the diagnosis(es) in detail. Medication risks, benefits, costs, interactions, and alternatives were discussed as indicated. I advised her to contact the office if her condition worsens, changes or fails to improve as anticipated. She expressed understanding with the diagnosis(es) and plan. Rudi Barrera, was evaluated through a synchronous (real-time) audio-video encounter. The patient (or guardian if applicable) is aware that this is a billable service. Verbal consent to proceed has been obtained within the past 12 months. The visit was conducted pursuant to the emergency declaration under the [facility][note 3][facility], [address] [place] [address] authority and the [employer] General Act. Patient identification was verified, and a caregiver was present when appropriate. The patient was located in a state where the provider was credentialed to provide care.     Sam Fuentes MD

## 2023-01-02 ENCOUNTER — HOSPITAL ENCOUNTER (EMERGENCY)
Age: 28
Discharge: HOME | End: 2023-01-02
Attending: EMERGENCY MEDICINE
Payer: COMMERCIAL

## 2023-01-02 VITALS
BODY MASS INDEX: 34.45 KG/M2 | HEIGHT: 65 IN | HEART RATE: 89 BPM | TEMPERATURE: 99.1 F | SYSTOLIC BLOOD PRESSURE: 125 MMHG | DIASTOLIC BLOOD PRESSURE: 67 MMHG | RESPIRATION RATE: 18 BRPM | OXYGEN SATURATION: 97 % | WEIGHT: 206.79 LBS

## 2023-01-02 DIAGNOSIS — A59.9 TRICHOMONAS INFECTION: ICD-10-CM

## 2023-01-02 DIAGNOSIS — N76.0 BV (BACTERIAL VAGINOSIS): ICD-10-CM

## 2023-01-02 DIAGNOSIS — B96.89 BV (BACTERIAL VAGINOSIS): ICD-10-CM

## 2023-01-02 DIAGNOSIS — R10.2 PELVIC PAIN: Primary | ICD-10-CM

## 2023-01-02 LAB
APPEARANCE UR: CLEAR
BACTERIA URNS QL MICRO: NEGATIVE /HPF
BILIRUB UR QL: NEGATIVE
CLUE CELLS VAG QL WET PREP: NORMAL
COLOR UR: ABNORMAL
EPITH CASTS URNS QL MICRO: ABNORMAL /LPF
GLUCOSE UR STRIP.AUTO-MCNC: NEGATIVE MG/DL
HCG UR QL: NEGATIVE
HGB UR QL STRIP: NEGATIVE
KETONES UR QL STRIP.AUTO: NEGATIVE MG/DL
KOH PREP SPEC: NORMAL
LEUKOCYTE ESTERASE UR QL STRIP.AUTO: ABNORMAL
NITRITE UR QL STRIP.AUTO: NEGATIVE
PH UR STRIP: 7.5 [PH] (ref 5–8)
PROT UR STRIP-MCNC: NEGATIVE MG/DL
RBC #/AREA URNS HPF: ABNORMAL /HPF (ref 0–5)
SERVICE CMNT-IMP: NORMAL
SP GR UR REFRACTOMETRY: 1.01
T VAGINALIS VAG QL WET PREP: NORMAL
TRICHOMONAS UR QL MICRO: PRESENT
UA: UC IF INDICATED,UAUC: ABNORMAL
UROBILINOGEN UR QL STRIP.AUTO: 1 EU/DL (ref 0.2–1)
WBC URNS QL MICRO: ABNORMAL /HPF (ref 0–4)

## 2023-01-02 PROCEDURE — 87210 SMEAR WET MOUNT SALINE/INK: CPT

## 2023-01-02 PROCEDURE — 81001 URINALYSIS AUTO W/SCOPE: CPT

## 2023-01-02 PROCEDURE — 81025 URINE PREGNANCY TEST: CPT

## 2023-01-02 PROCEDURE — 99283 EMERGENCY DEPT VISIT LOW MDM: CPT

## 2023-01-02 PROCEDURE — 87491 CHLMYD TRACH DNA AMP PROBE: CPT

## 2023-01-02 PROCEDURE — 74011250637 HC RX REV CODE- 250/637: Performed by: PHYSICIAN ASSISTANT

## 2023-01-02 RX ORDER — METRONIDAZOLE 500 MG/1
500 TABLET ORAL 2 TIMES DAILY
Qty: 14 TABLET | Refills: 0 | Status: SHIPPED | OUTPATIENT
Start: 2023-01-02 | End: 2023-01-09

## 2023-01-02 RX ORDER — METRONIDAZOLE 500 MG/1
500 TABLET ORAL
Status: COMPLETED | OUTPATIENT
Start: 2023-01-02 | End: 2023-01-02

## 2023-01-02 RX ADMIN — METRONIDAZOLE 500 MG: 500 TABLET ORAL at 14:46

## 2023-01-02 NOTE — ED TRIAGE NOTES
Pt reports endometriosis flare up since 12/26/2022, LMP 12/1/2022.  Pt also reports abdominal and pelvic pain/swelling

## 2023-01-02 NOTE — ED PROVIDER NOTES
[facility] EMERGENCY DEPT  EMERGENCY DEPARTMENT ENCOUNTER       Pt Name: Tacho Hughes  MRN: [id number]  [place] 1995  Date of evaluation: 1/2/2023  Provider: Virgin Gilford, PA-C   PCP: Myrna Farrell MD  Note Started: 2:12 PM 1/2/23     ED attending involment: I have seen and evaluated the patient. My supervision physician was available for consultation. CHIEF COMPLAINT       Chief Complaint   Patient presents with    Abdominal Pain        HISTORY OF PRESENT ILLNESS: 1 or more elements      History From: Patient  HPI Limitations : None     Tacho Hughes is a 24 y.o. female who presents pelvic pain since 12/26/2022. Patient states she has an appoint with her GYN doctor but it is not until January 17. She states she has a history of endometriosis and the pain feels similar to that. She does report irregular menstrual cycles. She is currently on NuvaRing and last removed it at the end of last month. She denies any nausea, vomiting, fever, chills, dysuria, urinary frequency or vaginal discharge. Nursing Notes were all reviewed and agreed with or any disagreements were addressed in the HPI. REVIEW OF SYSTEMS      Review of Systems   Gastrointestinal:  Negative for nausea and vomiting. Genitourinary:  Positive for pelvic pain. Negative for dysuria and vaginal discharge. Allergic/Immunologic: Negative for immunocompromised state. Positives and Pertinent negatives as per HPI. PAST HISTORY     Past Medical History:  Past Medical History:   Diagnosis Date    Asthma     Ill-defined condition     Endometriosis    Other ill-defined conditions(259.89)     ADHD    Seasonal allergies        Past Surgical History:  No past surgical history on file.     Family History:  Family History   Problem Relation Age of Onset    Diabetes Mother     Hypertension Mother     Diabetes Father     Hypertension Father        Social History:  Social History     Tobacco Use    Smoking status: Never    Smokeless tobacco: Never   Substance Use Topics    Alcohol use: No    Drug use: Yes     Types: Marijuana       Allergies: Allergies   Allergen Reactions    Pcn [Penicillins] Hives       CURRENT MEDICATIONS      Previous Medications    ASCORBIC ACID, VITAMIN C, (VITAMIN C) 500 MG TABLET    Take 1 Tablet by mouth daily. ETHINYL ESTRADIOL-ETONOGESTREL (NUVARING) 0.12-0.015 MG/24 HR VAGINAL RING    Insert vaginally for 3 weeks, then remove for a week    IBUPROFEN (MOTRIN) 600 MG TABLET    Take 1 Tab by mouth every six (6) hours as needed for Pain. ONDANSETRON (ZOFRAN ODT) 4 MG DISINTEGRATING TABLET    Take 1 Tablet by mouth every eight (8) hours as needed for Nausea or Nausea or Vomiting. ZINC 50 MG TAB TABLET    Take 1 Tablet by mouth daily. PHYSICAL EXAM      ED Triage Vitals [01/02/23 1244]   ED Encounter Vitals Group      /67      Pulse (Heart Rate) 89      Resp Rate 18      Temp 99.1 °F (37.3 °C)      Temp src       O2 Sat (%) 97 %      Weight 206 lb 12.7 oz      Height 5' 5\"        Physical Exam  Vitals and nursing note reviewed. Exam conducted with a chaperone present. Constitutional:       General: She is not in acute distress. Appearance: She is well-developed. HENT:      Head: Normocephalic and atraumatic. Eyes:      Conjunctiva/sclera: Conjunctivae normal.   Cardiovascular:      Rate and Rhythm: Normal rate and regular rhythm. Heart sounds: Normal heart sounds. Pulmonary:      Effort: Pulmonary effort is normal. No respiratory distress. Breath sounds: Normal breath sounds. No wheezing or rales. Abdominal:      Tenderness: There is abdominal tenderness in the suprapubic area. There is no guarding or rebound. Genitourinary:     Vagina: Vaginal discharge (Small amount of thin whitish discharge present) present. Cervix: Discharge present. No cervical motion tenderness. Adnexa: Right adnexa normal and left adnexa normal.        Right: No tenderness.           Left: No tenderness. Skin:     General: Skin is warm and dry. Neurological:      Mental Status: She is alert and oriented to person, place, and time. Psychiatric:         Behavior: Behavior normal.         Thought Content: Thought content normal.         Judgment: Judgment normal.        DIAGNOSTIC RESULTS   LABS:     Recent Results (from the past 12 hour(s))   URINALYSIS W/ REFLEX CULTURE    Collection Time: 01/02/23  1:40 PM    Specimen: Urine   Result Value Ref Range    Color YELLOW/STRAW      Appearance CLEAR CLEAR      Specific gravity 1.010      pH (UA) 7.5 5.0 - 8.0      Protein Negative NEG mg/dL    Glucose Negative NEG mg/dL    Ketone Negative NEG mg/dL    Bilirubin Negative NEG      Blood Negative NEG      Urobilinogen 1.0 0.2 - 1.0 EU/dL    Nitrites Negative NEG      Leukocyte Esterase LARGE (A) NEG      WBC 0-4 0 - 4 /hpf    RBC 0-5 0 - 5 /hpf    Epithelial cells MODERATE (A) FEW /lpf    Bacteria Negative NEG /hpf    UA:UC IF INDICATED CULTURE NOT INDICATED BY UA RESULT CNI      Trichomonas PRESENT (A) NEG     HCG URINE, QL. - POC    Collection Time: 01/02/23  1:41 PM   Result Value Ref Range    Pregnancy test,urine (POC) Negative NEG     KOH, OTHER SOURCES    Collection Time: 01/02/23  1:55 PM    Specimen: Vagina; Other   Result Value Ref Range    Special Requests: NO SPECIAL REQUESTS      KOH NO YEAST SEEN     WET PREP    Collection Time: 01/02/23  1:55 PM    Specimen: Miscellaneous sample   Result Value Ref Range    Clue cells CLUE CELLS PRESENT      Wet prep NO TRICHOMONAS SEEN             RADIOLOGY:  Non-plain film images such as CT, Ultrasound and MRI are read by the radiologist.    Interpretation per the Radiologist below, if available at the time of this note:     No results found.       PROCEDURES   Unless otherwise noted below, none  Procedures     EMERGENCY DEPARTMENT COURSE and DIFFERENTIAL DIAGNOSIS/MDM   Vitals:    Vitals:    01/02/23 1244   BP: 125/67   Pulse: 89   Resp: 18   Temp: 99.1 °F (37.3 °C)   SpO2: 97%   Weight: 93.8 kg (206 lb 12.7 oz)   Height: 5' 5\" (1.651 m)        Patient was given the following medications:  Medications   metroNIDAZOLE (FLAGYL) tablet 500 mg (has no administration in time range)       CONSULTS: (Who and What was discussed)  None    Chronic Conditions: ADHD, asthma, allergies, endometriosis    Social Determinants affecting Dx or Tx: None    Records Reviewed (source and summary): Nursing Notes and Old Medical Records    CC/HPI Summary, DDx, ED Course, and Reassessment: Patient presents with pelvic pain x1 week. She reports a history of endometriosis and feels that pain may be similar but states she has not had issues in a very long time. She denied any dysuria, urinary frequency, nausea, vomiting, vaginal discharge, vaginal bleeding. She does report irregular menses and states that she did not have an appointment for another 2 weeks with her GYN doctor she wanted to come in due to the worsening pain which she rates 10 out of 10. DDx: Pregnancy, ectopic, UTI, STI, BV, yeast, PID. Will do pelvic exam to evaluate any adnexal tenderness or cervical motion tenderness as well as get pelvic swabs if applicable. ED Course as of 01/02/23 1429   Mon Jan 02, 2023   1427 Trichomonas(!): PRESENT  Pelvic swabs showed positive clue cells indicating BV and UA showed trichomonas present. So patient will need metronidazole to treat both. We discussed results and causes of them. Patient advised that partner will also need to be treated. UA did also show a large amount of leukocytes but no WBCs or nitrates so do not feel the need to treat for a UTI at this time.   Patient may have also some pain associated with endometriosis but at this time we will treat for recurrent infections and have her keep GYN appointment should symptoms persist after appropriate antibiotic treatment [AH]      ED Course User Index  [AH] Shanon Balderrama PA-C       Disposition Considerations (Tests not done, Shared Decision Making, Pt Expectation of Test or Tx.): none     FINAL IMPRESSION     1. Pelvic pain    2. BV (bacterial vaginosis)    3. Trichomonas infection          DISPOSITION/PLAN   Discharged        Care plan outlined and precautions discussed. Patient has no new complaints, changes, or physical findings. Results of UA and pelvic swabs were reviewed with the patient. All medications were reviewed with the patient; will d/c home with metronidazole. All of pt's questions and concerns were addressed. Patient was instructed and agrees to follow up with OB/GYN, as well as to return to the ED upon further deterioration. Patient is ready to go home. PATIENT REFERRED TO:  Follow-up Information       Follow up With Specialties Details Why Contact Info    Richard Greenwood MD Internal Medicine Physician   [address] [place], [place] [phone]      [facility]    [address] [place] [address]    Your OB/GYN  On 1/17/2023 as scheduled               DISCHARGE MEDICATIONS:  Current Discharge Medication List        START taking these medications    Details   metroNIDAZOLE (FlagyL) 500 mg tablet Take 1 Tablet by mouth two (2) times a day for 7 days. Qty: 14 Tablet, Refills: 0  Start date: 1/2/2023, End date: 1/9/2023               DISCONTINUED MEDICATIONS:  Current Discharge Medication List          I am the Primary Clinician of Record. Osacr Naranjo PA-C (electronically signed)    (Please note that parts of this dictation were completed with voice recognition software. Quite often unanticipated grammatical, syntax, homophones, and other interpretive errors are inadvertently transcribed by the computer software. Please disregards these errors.  Please excuse any errors that have escaped final proofreading.)

## 2023-01-02 NOTE — LETTER
[facility] EMERGENCY DEPT  [address] [facility] [address] [phone]    Work/School Note    Date: 1/2/2023    To Whom It May concern:    Fuad Montaño was seen and treated today in the emergency room by the following provider(s):  Attending Provider: Marco Gifford MD  Physician Assistant: Gail Cronin PA-C. Fuad Montaño may return to work on 1/3/23.     Sincerely,          Jael Contreras RN

## 2023-01-02 NOTE — ED NOTES
Discharge instructions were given to the patient by Nerissa Galvan RN. The patient left the Emergency Department ambulatory, alert and oriented and in no acute distress with 1 prescription. The patient was encouraged to call or return to the ED for worsening issues or problems and was encouraged to schedule a follow up appointment for continuing care. The patient verbalized understanding of discharge instructions and prescriptions, all questions were answered. The patient has no further concerns at this time.

## 2023-01-02 NOTE — ED NOTES
Pt presents to ED ambulatory complaining of abd pain x 12/26. Pt reports she believes she is having an endometriosis flare up. Pt is alert and oriented x 4, RR even and unlabored, skin is warm and dry. Assessment completed and pt updated on plan of care. Call bell in reach. Emergency Department Nursing Plan of Care       The Nursing Plan of Care is developed from the Nursing assessment and Emergency Department Attending provider initial evaluation. The plan of care may be reviewed in the ED Provider note.     The Plan of Care was developed with the following considerations:   Patient / Family readiness to learn indicated by:verbalized understanding  Persons(s) to be included in education: patient  Barriers to Learning/Limitations:No    Signed     Dina Hidalgo    1/2/2023   1:15 PM

## 2023-01-03 LAB
C TRACH DNA SPEC QL NAA+PROBE: NEGATIVE
N GONORRHOEA DNA SPEC QL NAA+PROBE: NEGATIVE
SAMPLE TYPE: NORMAL
SERVICE CMNT-IMP: NORMAL
SPECIMEN SOURCE: NORMAL

## 2025-05-17 ENCOUNTER — HOSPITAL ENCOUNTER (EMERGENCY)
Facility: HOSPITAL | Age: 30
Discharge: HOME | End: 2025-05-17
Attending: EMERGENCY MEDICINE
Payer: COMMERCIAL

## 2025-05-17 VITALS
RESPIRATION RATE: 16 BRPM | HEART RATE: 71 BPM | OXYGEN SATURATION: 100 % | TEMPERATURE: 98.1 F | HEIGHT: 65 IN | WEIGHT: 210.54 LBS | BODY MASS INDEX: 35.08 KG/M2 | SYSTOLIC BLOOD PRESSURE: 129 MMHG | DIASTOLIC BLOOD PRESSURE: 87 MMHG

## 2025-05-17 DIAGNOSIS — Z00.00 WELLNESS EXAMINATION: Primary | ICD-10-CM

## 2025-05-17 LAB
AMPHET UR QL SCN: NEGATIVE
BARBITURATES UR QL SCN: NEGATIVE
BENZODIAZ UR QL: NEGATIVE
CANNABINOIDS UR QL SCN: NEGATIVE
COCAINE UR QL SCN: NEGATIVE
Lab: NORMAL
METHADONE UR QL: NEGATIVE
OPIATES UR QL: NEGATIVE
PCP UR QL: NEGATIVE

## 2025-05-17 PROCEDURE — 99283 EMERGENCY DEPT VISIT LOW MDM: CPT

## 2025-05-17 PROCEDURE — 80307 DRUG TEST PRSMV CHEM ANLYZR: CPT

## 2025-05-17 ASSESSMENT — LIFESTYLE VARIABLES
HOW MANY STANDARD DRINKS CONTAINING ALCOHOL DO YOU HAVE ON A TYPICAL DAY: PATIENT DOES NOT DRINK
HOW OFTEN DO YOU HAVE A DRINK CONTAINING ALCOHOL: NEVER

## 2025-05-17 ASSESSMENT — PAIN SCALES - GENERAL: PAINLEVEL_OUTOF10: 0

## 2025-05-17 NOTE — ED PROVIDER NOTES
[facility] EMERGENCY DEPARTMENT  EMERGENCY DEPARTMENT ENCOUNTER       Pt Name: Paola Haley  MRN: [id number]  Birthdate 1995  Date of evaluation: 5/17/2025  Provider: Francisco Figueroa MD   PCP: Pedro Li MD  Note Started: 12:44 PM EDT 5/17/25     CHIEF COMPLAINT       Chief Complaint   Patient presents with    Drug / Alcohol Assessment     Pt presents ambulatory to ED via triage for a urine drug screening for employment.         HISTORY OF PRESENT ILLNESS: 1 or more elements      History From: Patient  HPI Limitations: None     Paola Haley is a 29 y.o. female who presents with no significant past medical history other than stated below, presenting for evaluation for drug screen.  Patient reports that she has had no complaints or any symptoms.  She was working today, she is an  at a local factory.  She was told that everyone had to have a drug test that day because her frequent accidents.  She has not been part of this accident was required to get this drug screen.     Nursing Notes were all reviewed and agreed with or any disagreements were addressed in the HPI.     REVIEW OF SYSTEMS      Review of Systems     Positives and Pertinent negatives as per HPI.    PAST HISTORY     Past Medical History:  Past Medical History:   Diagnosis Date    Asthma     Ill-defined condition     Endometriosis    Other ill-defined conditions(799.89)     ADHD    Seasonal allergies          Past Surgical History:  No past surgical history on file.    Family History:  Family History   Problem Relation Age of Onset    Hypertension Father     Diabetes Father     Diabetes Mother     Hypertension Mother        Social History:  Social History     Tobacco Use    Smoking status: Never    Smokeless tobacco: Never   Substance Use Topics    Alcohol use: No    Drug use: Yes     Types: Marijuana (Weed)       Allergies:  Allergies   Allergen Reactions    Penicillins Hives       CURRENT MEDICATIONS      Previous  Pt Expectation of Test or Tx.):      FINAL IMPRESSION     1. Wellness examination          DISPOSITION/PLAN   DISPOSITION Decision To Discharge 05/17/2025 12:44:52 PM   DISPOSITION CONDITION Stable           Discharge Note: The patient is stable for discharge home. The signs, symptoms, diagnosis, and discharge instructions have been discussed, understanding conveyed, and agreed upon. The patient is to follow up as recommended or return to ER should their symptoms worsen.      PATIENT REFERRED TO:  Pedro Li MD  [address]  [address]  [facility] [id number] [phone]    In 1 week  As needed    [facility] Emergency Department  [address]  [facility] [address]  [phone]  In 2 days  If symptoms worsen         DISCHARGE MEDICATIONS:     Medication List        ASK your doctor about these medications      ascorbic acid 500 MG tablet  Commonly known as: VITAMIN C     etonogestrel-ethinyl estradiol 0.12-0.015 MG/24HR vaginal ring  Commonly known as: NUVARING     ibuprofen 600 MG tablet  Commonly known as: ADVIL;MOTRIN     ondansetron 4 MG disintegrating tablet  Commonly known as: ZOFRAN-ODT                DISCONTINUED MEDICATIONS:  Current Discharge Medication List          I am the Primary Clinician of Record.   Francisco Figueroa MD (electronically signed)      (Please note that parts of this dictation were completed with voice recognition software. Quite often unanticipated grammatical, syntax, homophones, and other interpretive errors are inadvertently transcribed by the computer software. Please disregards these errors. Please excuse any errors that have escaped final proofreading.)         Francisco Figueroa MD  05/17/25 [id number]

## 2025-05-17 NOTE — DISCHARGE INSTRUCTIONS
Thank you for choosing our Emergency Department for your care.  It is our privilege to care for you in your time of need.  In the next several days, you may receive a survey via email or mailed to your home about your experience with our team.  We would greatly appreciate you taking a few minutes to complete the survey, as we use this information to learn what we have done well and what we could be doing better. Thank you for trusting us with your care!    Below you will find a list of your tests from today's visit.   Labs and Radiology Studies  Recent Results (from the past 12 hours)   Urine Drug Screen    Collection Time: 05/17/25 12:33 PM   Result Value Ref Range    Amphetamine, Urine Negative NEG      Barbiturates, Urine Negative NEG      Benzodiazepines, Urine Negative NEG      Cocaine, Urine Negative NEG      Methadone, Urine Negative NEG      Opiates, Urine Negative NEG      Phencyclidine, Urine Negative NEG      THC, TH-Cannabinol, Urine Negative NEG      Comments: (NOTE)      No results found.  ------------------------------------------------------------------------------------------------------------  The evaluation and treatment you received in the Emergency Department were for an urgent problem. It is important that you follow-up with a doctor, nurse practitioner, or physician assistant to:  (1) confirm your diagnosis,  (2) re-evaluation of changes in your illness and treatment, and (3) for ongoing care. Please take your discharge instructions with you when you go to your follow-up appointment.     If you have any problem arranging a follow-up appointment, contact us!  If your symptoms become worse or you do not improve as expected, please return to us. We are available 24 hours a day.     If a prescription has been provided, please fill it as soon as possible to prevent a delay in treatment. If you have any questions or reservations about taking the medication due to side effects or interactions